# Patient Record
Sex: MALE | Race: OTHER | HISPANIC OR LATINO | Employment: OTHER | URBAN - METROPOLITAN AREA
[De-identification: names, ages, dates, MRNs, and addresses within clinical notes are randomized per-mention and may not be internally consistent; named-entity substitution may affect disease eponyms.]

---

## 2017-03-28 DIAGNOSIS — I70.213 ATHEROSCLEROSIS OF NATIVE ARTERY OF BOTH LOWER EXTREMITIES WITH INTERMITTENT CLAUDICATION (HCC): ICD-10-CM

## 2017-04-14 ENCOUNTER — GENERIC CONVERSION - ENCOUNTER (OUTPATIENT)
Dept: OTHER | Facility: OTHER | Age: 62
End: 2017-04-14

## 2017-04-20 ENCOUNTER — GENERIC CONVERSION - ENCOUNTER (OUTPATIENT)
Dept: OTHER | Facility: OTHER | Age: 62
End: 2017-04-20

## 2017-04-28 ENCOUNTER — APPOINTMENT (EMERGENCY)
Dept: RADIOLOGY | Facility: HOSPITAL | Age: 62
End: 2017-04-28
Payer: COMMERCIAL

## 2017-04-28 ENCOUNTER — HOSPITAL ENCOUNTER (EMERGENCY)
Facility: HOSPITAL | Age: 62
Discharge: HOME | End: 2017-04-28
Attending: EMERGENCY MEDICINE | Admitting: EMERGENCY MEDICINE
Payer: COMMERCIAL

## 2017-04-28 VITALS
TEMPERATURE: 98.6 F | DIASTOLIC BLOOD PRESSURE: 97 MMHG | HEART RATE: 89 BPM | WEIGHT: 184 LBS | RESPIRATION RATE: 18 BRPM | SYSTOLIC BLOOD PRESSURE: 177 MMHG | OXYGEN SATURATION: 97 %

## 2017-04-28 DIAGNOSIS — M54.12 RADICULOPATHY OF CERVICAL REGION: Primary | ICD-10-CM

## 2017-04-28 DIAGNOSIS — M79.609 MUSCULOSKELETAL PAIN OF EXTREMITY: ICD-10-CM

## 2017-04-28 PROCEDURE — 72040 X-RAY EXAM NECK SPINE 2-3 VW: CPT

## 2017-04-28 PROCEDURE — 99283 EMERGENCY DEPT VISIT LOW MDM: CPT

## 2017-04-28 PROCEDURE — 96372 THER/PROPH/DIAG INJ SC/IM: CPT

## 2017-04-28 PROCEDURE — 72072 X-RAY EXAM THORAC SPINE 3VWS: CPT

## 2017-04-28 RX ORDER — NAPROXEN 500 MG/1
500 TABLET ORAL 2 TIMES DAILY WITH MEALS
Qty: 20 TABLET | Refills: 0 | Status: SHIPPED | OUTPATIENT
Start: 2017-04-28 | End: 2018-02-06

## 2017-04-28 RX ORDER — KETOROLAC TROMETHAMINE 30 MG/ML
30 INJECTION, SOLUTION INTRAMUSCULAR; INTRAVENOUS ONCE
Status: COMPLETED | OUTPATIENT
Start: 2017-04-28 | End: 2017-04-28

## 2017-04-28 RX ADMIN — KETOROLAC TROMETHAMINE 30 MG: 30 INJECTION, SOLUTION INTRAMUSCULAR at 13:28

## 2017-05-04 ENCOUNTER — ALLSCRIPTS OFFICE VISIT (OUTPATIENT)
Dept: OTHER | Facility: OTHER | Age: 62
End: 2017-05-04

## 2017-05-04 ENCOUNTER — APPOINTMENT (OUTPATIENT)
Dept: LAB | Facility: CLINIC | Age: 62
End: 2017-05-04
Payer: COMMERCIAL

## 2017-05-04 ENCOUNTER — TRANSCRIBE ORDERS (OUTPATIENT)
Dept: LAB | Facility: CLINIC | Age: 62
End: 2017-05-04

## 2017-05-04 ENCOUNTER — LAB REQUISITION (OUTPATIENT)
Dept: LAB | Facility: HOSPITAL | Age: 62
End: 2017-05-04
Payer: COMMERCIAL

## 2017-05-04 DIAGNOSIS — I10 ESSENTIAL (PRIMARY) HYPERTENSION: ICD-10-CM

## 2017-05-04 DIAGNOSIS — I25.10 ATHEROSCLEROTIC HEART DISEASE OF NATIVE CORONARY ARTERY WITHOUT ANGINA PECTORIS: ICD-10-CM

## 2017-05-04 DIAGNOSIS — M54.2 CERVICALGIA: ICD-10-CM

## 2017-05-04 DIAGNOSIS — E78.5 HYPERLIPIDEMIA: ICD-10-CM

## 2017-05-04 DIAGNOSIS — R73.03 PREDIABETES: ICD-10-CM

## 2017-05-04 DIAGNOSIS — R39.89 OTHER SYMPTOMS AND SIGNS INVOLVING THE GENITOURINARY SYSTEM: ICD-10-CM

## 2017-05-04 DIAGNOSIS — M54.10 RADICULOPATHY: ICD-10-CM

## 2017-05-04 DIAGNOSIS — R33.9 RETENTION OF URINE: ICD-10-CM

## 2017-05-04 LAB
ALBUMIN SERPL BCP-MCNC: 3.8 G/DL (ref 3.5–5)
ALP SERPL-CCNC: 104 U/L (ref 46–116)
ALT SERPL W P-5'-P-CCNC: 60 U/L (ref 12–78)
ANION GAP SERPL CALCULATED.3IONS-SCNC: 8 MMOL/L (ref 4–13)
AST SERPL W P-5'-P-CCNC: 42 U/L (ref 5–45)
BILIRUB SERPL-MCNC: 1.5 MG/DL (ref 0.2–1)
BILIRUB UR QL STRIP: NORMAL
BUN SERPL-MCNC: 9 MG/DL (ref 5–25)
CALCIUM SERPL-MCNC: 9 MG/DL (ref 8.3–10.1)
CHLORIDE SERPL-SCNC: 103 MMOL/L (ref 100–108)
CLARITY UR: NORMAL
CO2 SERPL-SCNC: 28 MMOL/L (ref 21–32)
COLOR UR: NORMAL
CREAT SERPL-MCNC: 0.83 MG/DL (ref 0.6–1.3)
GFR SERPL CREATININE-BSD FRML MDRD: >60 ML/MIN/1.73SQ M
GLUCOSE (HISTORICAL): 1000
GLUCOSE SERPL-MCNC: 164 MG/DL (ref 65–140)
GLUCOSE SERPL-MCNC: 173 MG/DL
HGB UR QL STRIP.AUTO: NORMAL
KETONES UR STRIP-MCNC: NORMAL MG/DL
LEUKOCYTE ESTERASE UR QL STRIP: NORMAL
NITRITE UR QL STRIP: NORMAL
PH UR STRIP.AUTO: 7.5 [PH]
POTASSIUM SERPL-SCNC: 4.5 MMOL/L (ref 3.5–5.3)
PROT SERPL-MCNC: 7.6 G/DL (ref 6.4–8.2)
PROT UR STRIP-MCNC: 30 MG/DL
SODIUM SERPL-SCNC: 139 MMOL/L (ref 136–145)
SP GR UR STRIP.AUTO: 1.01
UROBILINOGEN UR QL STRIP.AUTO: 4

## 2017-05-04 PROCEDURE — 80053 COMPREHEN METABOLIC PANEL: CPT

## 2017-05-04 PROCEDURE — 81003 URINALYSIS AUTO W/O SCOPE: CPT | Performed by: INTERNAL MEDICINE

## 2017-05-04 PROCEDURE — 36415 COLL VENOUS BLD VENIPUNCTURE: CPT

## 2017-05-05 ENCOUNTER — APPOINTMENT (OUTPATIENT)
Dept: LAB | Facility: CLINIC | Age: 62
End: 2017-05-05
Payer: COMMERCIAL

## 2017-05-05 DIAGNOSIS — I25.10 ATHEROSCLEROTIC HEART DISEASE OF NATIVE CORONARY ARTERY WITHOUT ANGINA PECTORIS: ICD-10-CM

## 2017-05-05 DIAGNOSIS — E78.5 HYPERLIPIDEMIA: ICD-10-CM

## 2017-05-05 LAB
BILIRUB UR QL STRIP: NEGATIVE
CHOLEST SERPL-MCNC: 111 MG/DL (ref 50–200)
CLARITY UR: CLEAR
COLOR UR: ABNORMAL
GLUCOSE UR STRIP-MCNC: ABNORMAL MG/DL
HDLC SERPL-MCNC: 24 MG/DL (ref 40–60)
HGB UR QL STRIP.AUTO: NEGATIVE
KETONES UR STRIP-MCNC: NEGATIVE MG/DL
LDLC SERPL CALC-MCNC: 18 MG/DL (ref 0–100)
LEUKOCYTE ESTERASE UR QL STRIP: NEGATIVE
NITRITE UR QL STRIP: NEGATIVE
PH UR STRIP.AUTO: 7.5 [PH] (ref 4.5–8)
PROT UR STRIP-MCNC: NEGATIVE MG/DL
SP GR UR STRIP.AUTO: 1.02 (ref 1–1.03)
TRIGL SERPL-MCNC: 343 MG/DL
UROBILINOGEN UR QL STRIP.AUTO: 2 E.U./DL

## 2017-05-05 PROCEDURE — 36415 COLL VENOUS BLD VENIPUNCTURE: CPT

## 2017-05-05 PROCEDURE — 80061 LIPID PANEL: CPT

## 2017-05-08 ENCOUNTER — GENERIC CONVERSION - ENCOUNTER (OUTPATIENT)
Dept: OTHER | Facility: OTHER | Age: 62
End: 2017-05-08

## 2017-05-09 ENCOUNTER — GENERIC CONVERSION - ENCOUNTER (OUTPATIENT)
Dept: OTHER | Facility: OTHER | Age: 62
End: 2017-05-09

## 2017-05-10 ENCOUNTER — ALLSCRIPTS OFFICE VISIT (OUTPATIENT)
Dept: OTHER | Facility: OTHER | Age: 62
End: 2017-05-10

## 2017-05-10 LAB — HBA1C MFR BLD HPLC: 6.7 %

## 2017-05-17 ENCOUNTER — GENERIC CONVERSION - ENCOUNTER (OUTPATIENT)
Dept: OTHER | Facility: OTHER | Age: 62
End: 2017-05-17

## 2017-05-19 ENCOUNTER — ALLSCRIPTS OFFICE VISIT (OUTPATIENT)
Dept: OTHER | Facility: OTHER | Age: 62
End: 2017-05-19

## 2017-05-23 ENCOUNTER — ALLSCRIPTS OFFICE VISIT (OUTPATIENT)
Dept: OTHER | Facility: OTHER | Age: 62
End: 2017-05-23

## 2017-06-16 ENCOUNTER — GENERIC CONVERSION - ENCOUNTER (OUTPATIENT)
Dept: OTHER | Facility: OTHER | Age: 62
End: 2017-06-16

## 2017-06-16 ENCOUNTER — ALLSCRIPTS OFFICE VISIT (OUTPATIENT)
Dept: OTHER | Facility: OTHER | Age: 62
End: 2017-06-16

## 2017-06-16 DIAGNOSIS — R09.89 OTHER SPECIFIED SYMPTOMS AND SIGNS INVOLVING THE CIRCULATORY AND RESPIRATORY SYSTEMS: ICD-10-CM

## 2017-06-16 LAB
LEFT EYE DIABETIC RETINOPATHY: NORMAL
RIGHT EYE DIABETIC RETINOPATHY: NORMAL

## 2017-06-21 ENCOUNTER — OPTICAL OFFICE (OUTPATIENT)
Dept: URBAN - METROPOLITAN AREA CLINIC 146 | Facility: CLINIC | Age: 62
Setting detail: OPHTHALMOLOGY
End: 2017-06-21

## 2017-06-21 DIAGNOSIS — H52.223: ICD-10-CM

## 2017-06-21 PROCEDURE — V2203 LENS SPHCYL BIFOCAL 4.00D/.1: HCPCS | Performed by: OPHTHALMOLOGY

## 2017-06-21 PROCEDURE — V2020 VISION SVCS FRAMES PURCHASES: HCPCS | Performed by: OPHTHALMOLOGY

## 2017-07-05 ENCOUNTER — ALLSCRIPTS OFFICE VISIT (OUTPATIENT)
Dept: OTHER | Facility: OTHER | Age: 62
End: 2017-07-05

## 2017-07-05 LAB — GLUCOSE SERPL-MCNC: 95 MG/DL

## 2017-07-27 ENCOUNTER — ALLSCRIPTS OFFICE VISIT (OUTPATIENT)
Dept: OTHER | Facility: OTHER | Age: 62
End: 2017-07-27

## 2017-08-24 ENCOUNTER — TRANSCRIBE ORDERS (OUTPATIENT)
Dept: ADMINISTRATIVE | Facility: HOSPITAL | Age: 62
End: 2017-08-24

## 2017-08-24 ENCOUNTER — ALLSCRIPTS OFFICE VISIT (OUTPATIENT)
Dept: OTHER | Facility: OTHER | Age: 62
End: 2017-08-24

## 2017-08-24 ENCOUNTER — APPOINTMENT (OUTPATIENT)
Dept: LAB | Facility: CLINIC | Age: 62
End: 2017-08-24
Payer: COMMERCIAL

## 2017-08-24 ENCOUNTER — GENERIC CONVERSION - ENCOUNTER (OUTPATIENT)
Dept: OTHER | Facility: OTHER | Age: 62
End: 2017-08-24

## 2017-08-24 DIAGNOSIS — M54.10 RADICULAR SYNDROME OF LOWER LIMBS: ICD-10-CM

## 2017-08-24 DIAGNOSIS — E11.40 TYPE 2 DIABETES MELLITUS WITH DIABETIC NEUROPATHY (HCC): ICD-10-CM

## 2017-08-24 DIAGNOSIS — R77.8 OTHER SPECIFIED ABNORMALITIES OF PLASMA PROTEINS: ICD-10-CM

## 2017-08-24 DIAGNOSIS — M43.02 SPONDYLOLYSIS OF CERVICAL REGION: Primary | ICD-10-CM

## 2017-08-24 DIAGNOSIS — M54.2 CERVICALGIA: ICD-10-CM

## 2017-08-24 DIAGNOSIS — I10 ESSENTIAL (PRIMARY) HYPERTENSION: ICD-10-CM

## 2017-08-24 LAB
ALBUMIN SERPL BCP-MCNC: 3.8 G/DL (ref 3.5–5)
ALP SERPL-CCNC: 92 U/L (ref 46–116)
ALT SERPL W P-5'-P-CCNC: 38 U/L (ref 12–78)
ANION GAP SERPL CALCULATED.3IONS-SCNC: 9 MMOL/L (ref 4–13)
AST SERPL W P-5'-P-CCNC: 26 U/L (ref 5–45)
BILIRUB SERPL-MCNC: 1.3 MG/DL (ref 0.2–1)
BUN SERPL-MCNC: 18 MG/DL (ref 5–25)
CALCIUM SERPL-MCNC: 9.2 MG/DL (ref 8.3–10.1)
CHLORIDE SERPL-SCNC: 101 MMOL/L (ref 100–108)
CO2 SERPL-SCNC: 28 MMOL/L (ref 21–32)
CREAT SERPL-MCNC: 0.91 MG/DL (ref 0.6–1.3)
EST. AVERAGE GLUCOSE BLD GHB EST-MCNC: 105 MG/DL
GFR SERPL CREATININE-BSD FRML MDRD: 89 ML/MIN/1.73SQ M
GLUCOSE SERPL-MCNC: 91 MG/DL (ref 65–140)
HBA1C MFR BLD: 5.3 % (ref 4.2–6.3)
POTASSIUM SERPL-SCNC: 3.6 MMOL/L (ref 3.5–5.3)
PROT SERPL-MCNC: 8.4 G/DL (ref 6.4–8.2)
SODIUM SERPL-SCNC: 138 MMOL/L (ref 136–145)

## 2017-08-24 PROCEDURE — 36415 COLL VENOUS BLD VENIPUNCTURE: CPT

## 2017-08-24 PROCEDURE — 80053 COMPREHEN METABOLIC PANEL: CPT

## 2017-08-24 PROCEDURE — 83036 HEMOGLOBIN GLYCOSYLATED A1C: CPT

## 2017-08-30 ENCOUNTER — APPOINTMENT (OUTPATIENT)
Dept: PHYSICAL THERAPY | Facility: CLINIC | Age: 62
End: 2017-08-30
Payer: COMMERCIAL

## 2017-08-30 PROCEDURE — 97162 PT EVAL MOD COMPLEX 30 MIN: CPT

## 2017-08-30 PROCEDURE — 97110 THERAPEUTIC EXERCISES: CPT

## 2017-08-31 ENCOUNTER — GENERIC CONVERSION - ENCOUNTER (OUTPATIENT)
Dept: OTHER | Facility: OTHER | Age: 62
End: 2017-08-31

## 2017-09-06 ENCOUNTER — APPOINTMENT (OUTPATIENT)
Dept: PHYSICAL THERAPY | Facility: CLINIC | Age: 62
End: 2017-09-06
Payer: COMMERCIAL

## 2017-09-07 ENCOUNTER — APPOINTMENT (OUTPATIENT)
Dept: PHYSICAL THERAPY | Facility: CLINIC | Age: 62
End: 2017-09-07
Payer: COMMERCIAL

## 2017-09-12 ENCOUNTER — APPOINTMENT (OUTPATIENT)
Dept: PHYSICAL THERAPY | Facility: CLINIC | Age: 62
End: 2017-09-12
Payer: COMMERCIAL

## 2017-09-13 ENCOUNTER — HOSPITAL ENCOUNTER (OUTPATIENT)
Dept: NON INVASIVE DIAGNOSTICS | Facility: CLINIC | Age: 62
Discharge: HOME | End: 2017-09-13
Payer: COMMERCIAL

## 2017-09-13 ENCOUNTER — OFFICE VISIT (OUTPATIENT)
Dept: RADIOLOGY | Facility: CLINIC | Age: 62
End: 2017-09-13
Payer: COMMERCIAL

## 2017-09-13 ENCOUNTER — APPOINTMENT (OUTPATIENT)
Dept: PHYSICAL THERAPY | Facility: CLINIC | Age: 62
End: 2017-09-13
Payer: COMMERCIAL

## 2017-09-13 DIAGNOSIS — M54.2 CERVICALGIA: ICD-10-CM

## 2017-09-13 DIAGNOSIS — M43.02 SPONDYLOLYSIS OF CERVICAL REGION: ICD-10-CM

## 2017-09-13 DIAGNOSIS — R09.89 OTHER SPECIFIED SYMPTOMS AND SIGNS INVOLVING THE CIRCULATORY AND RESPIRATORY SYSTEMS: ICD-10-CM

## 2017-09-13 DIAGNOSIS — M54.10 RADICULAR SYNDROME OF LOWER LIMBS: ICD-10-CM

## 2017-09-13 PROCEDURE — 97140 MANUAL THERAPY 1/> REGIONS: CPT

## 2017-09-13 PROCEDURE — 93923 UPR/LXTR ART STDY 3+ LVLS: CPT

## 2017-09-13 PROCEDURE — 93925 LOWER EXTREMITY STUDY: CPT

## 2017-09-13 PROCEDURE — 95886 MUSC TEST DONE W/N TEST COMP: CPT | Performed by: PHYSICAL THERAPIST

## 2017-09-13 PROCEDURE — 97110 THERAPEUTIC EXERCISES: CPT

## 2017-09-13 PROCEDURE — 97012 MECHANICAL TRACTION THERAPY: CPT

## 2017-09-13 PROCEDURE — 95908 NRV CNDJ TST 3-4 STUDIES: CPT | Performed by: PHYSICAL THERAPIST

## 2017-09-14 ENCOUNTER — GENERIC CONVERSION - ENCOUNTER (OUTPATIENT)
Dept: OTHER | Facility: OTHER | Age: 62
End: 2017-09-14

## 2017-09-14 ENCOUNTER — APPOINTMENT (OUTPATIENT)
Dept: PHYSICAL THERAPY | Facility: CLINIC | Age: 62
End: 2017-09-14
Payer: COMMERCIAL

## 2017-09-15 ENCOUNTER — APPOINTMENT (OUTPATIENT)
Dept: PHYSICAL THERAPY | Facility: CLINIC | Age: 62
End: 2017-09-15
Payer: COMMERCIAL

## 2017-09-19 ENCOUNTER — APPOINTMENT (OUTPATIENT)
Dept: PHYSICAL THERAPY | Facility: CLINIC | Age: 62
End: 2017-09-19
Payer: COMMERCIAL

## 2017-09-20 ENCOUNTER — APPOINTMENT (OUTPATIENT)
Dept: PHYSICAL THERAPY | Facility: CLINIC | Age: 62
End: 2017-09-20
Payer: COMMERCIAL

## 2017-09-20 PROCEDURE — 97012 MECHANICAL TRACTION THERAPY: CPT

## 2017-09-20 PROCEDURE — 97110 THERAPEUTIC EXERCISES: CPT

## 2017-09-21 ENCOUNTER — APPOINTMENT (OUTPATIENT)
Dept: PHYSICAL THERAPY | Facility: CLINIC | Age: 62
End: 2017-09-21
Payer: COMMERCIAL

## 2017-09-25 ENCOUNTER — APPOINTMENT (OUTPATIENT)
Dept: LAB | Facility: CLINIC | Age: 62
End: 2017-09-25
Payer: COMMERCIAL

## 2017-09-25 ENCOUNTER — ALLSCRIPTS OFFICE VISIT (OUTPATIENT)
Dept: OTHER | Facility: OTHER | Age: 62
End: 2017-09-25

## 2017-09-25 DIAGNOSIS — R77.8 OTHER SPECIFIED ABNORMALITIES OF PLASMA PROTEINS: ICD-10-CM

## 2017-09-25 DIAGNOSIS — I10 ESSENTIAL (PRIMARY) HYPERTENSION: ICD-10-CM

## 2017-09-25 DIAGNOSIS — I70.213 ATHEROSCLEROSIS OF NATIVE ARTERY OF BOTH LOWER EXTREMITIES WITH INTERMITTENT CLAUDICATION (HCC): ICD-10-CM

## 2017-09-25 DIAGNOSIS — E11.69 TYPE 2 DIABETES MELLITUS WITH OTHER SPECIFIED COMPLICATION (HCC): Primary | ICD-10-CM

## 2017-09-25 DIAGNOSIS — E11.40 TYPE 2 DIABETES MELLITUS WITH DIABETIC NEUROPATHY (HCC): ICD-10-CM

## 2017-09-25 LAB
BASOPHILS # BLD AUTO: 0.07 THOUSANDS/ΜL (ref 0–0.1)
BASOPHILS NFR BLD AUTO: 1 % (ref 0–1)
EOSINOPHIL # BLD AUTO: 0.14 THOUSAND/ΜL (ref 0–0.61)
EOSINOPHIL NFR BLD AUTO: 2 % (ref 0–6)
ERYTHROCYTE [DISTWIDTH] IN BLOOD BY AUTOMATED COUNT: 12.6 % (ref 11.6–15.1)
HCT VFR BLD AUTO: 41.9 % (ref 36.5–49.3)
HGB BLD-MCNC: 14.8 G/DL (ref 12–17)
LYMPHOCYTES # BLD AUTO: 2.28 THOUSANDS/ΜL (ref 0.6–4.47)
LYMPHOCYTES NFR BLD AUTO: 31 % (ref 14–44)
MCH RBC QN AUTO: 31.8 PG (ref 26.8–34.3)
MCHC RBC AUTO-ENTMCNC: 35.3 G/DL (ref 31.4–37.4)
MCV RBC AUTO: 90 FL (ref 82–98)
MONOCYTES # BLD AUTO: 0.43 THOUSAND/ΜL (ref 0.17–1.22)
MONOCYTES NFR BLD AUTO: 6 % (ref 4–12)
NEUTROPHILS # BLD AUTO: 4.46 THOUSANDS/ΜL (ref 1.85–7.62)
NEUTS SEG NFR BLD AUTO: 60 % (ref 43–75)
PLATELET # BLD AUTO: 150 THOUSANDS/UL (ref 149–390)
PMV BLD AUTO: 12.1 FL (ref 8.9–12.7)
RBC # BLD AUTO: 4.65 MILLION/UL (ref 3.88–5.62)
WBC # BLD AUTO: 7.38 THOUSAND/UL (ref 4.31–10.16)

## 2017-09-25 PROCEDURE — 86803 HEPATITIS C AB TEST: CPT

## 2017-09-25 PROCEDURE — 86334 IMMUNOFIX E-PHORESIS SERUM: CPT

## 2017-09-25 PROCEDURE — 87389 HIV-1 AG W/HIV-1&-2 AB AG IA: CPT

## 2017-09-25 PROCEDURE — 84165 PROTEIN E-PHORESIS SERUM: CPT

## 2017-09-25 PROCEDURE — 85025 COMPLETE CBC W/AUTO DIFF WBC: CPT

## 2017-09-25 PROCEDURE — 36415 COLL VENOUS BLD VENIPUNCTURE: CPT

## 2017-09-26 ENCOUNTER — APPOINTMENT (OUTPATIENT)
Dept: PHYSICAL THERAPY | Facility: CLINIC | Age: 62
End: 2017-09-26
Payer: COMMERCIAL

## 2017-09-26 LAB — HCV AB SER QL: NORMAL

## 2017-09-27 ENCOUNTER — APPOINTMENT (OUTPATIENT)
Dept: PHYSICAL THERAPY | Facility: CLINIC | Age: 62
End: 2017-09-27
Payer: COMMERCIAL

## 2017-09-27 LAB — HIV 1+2 AB+HIV1 P24 AG SERPL QL IA: NORMAL

## 2017-09-28 ENCOUNTER — APPOINTMENT (OUTPATIENT)
Dept: PHYSICAL THERAPY | Facility: CLINIC | Age: 62
End: 2017-09-28
Payer: COMMERCIAL

## 2017-09-28 ENCOUNTER — ALLSCRIPTS OFFICE VISIT (OUTPATIENT)
Dept: OTHER | Facility: OTHER | Age: 62
End: 2017-09-28

## 2017-09-28 LAB
ALBUMIN SERPL ELPH-MCNC: 4.08 G/DL (ref 3.5–5)
ALBUMIN SERPL ELPH-MCNC: 54.4 % (ref 52–65)
ALPHA1 GLOB SERPL ELPH-MCNC: 0.24 G/DL (ref 0.1–0.4)
ALPHA1 GLOB SERPL ELPH-MCNC: 3.2 % (ref 2.5–5)
ALPHA2 GLOB SERPL ELPH-MCNC: 0.65 G/DL (ref 0.4–1.2)
ALPHA2 GLOB SERPL ELPH-MCNC: 8.7 % (ref 7–13)
BETA GLOB ABNORMAL SERPL ELPH-MCNC: 0.41 G/DL (ref 0.4–0.8)
BETA1 GLOB SERPL ELPH-MCNC: 5.5 % (ref 5–13)
BETA2 GLOB SERPL ELPH-MCNC: 4.5 % (ref 2–8)
BETA2+GAMMA GLOB SERPL ELPH-MCNC: 0.34 G/DL (ref 0.2–0.5)
GAMMA GLOB ABNORMAL SERPL ELPH-MCNC: 1.78 G/DL (ref 0.5–1.6)
GAMMA GLOB SERPL ELPH-MCNC: 23.7 % (ref 12–22)
IGG/ALB SER: 1.19 {RATIO} (ref 1.1–1.8)
INTERPRETATION UR IFE-IMP: NORMAL
PROT SERPL-MCNC: 7.5 G/DL (ref 6.4–8.2)

## 2017-10-03 ENCOUNTER — APPOINTMENT (OUTPATIENT)
Dept: PHYSICAL THERAPY | Facility: CLINIC | Age: 62
End: 2017-10-03
Payer: COMMERCIAL

## 2017-10-04 ENCOUNTER — APPOINTMENT (OUTPATIENT)
Dept: PHYSICAL THERAPY | Facility: CLINIC | Age: 62
End: 2017-10-04
Payer: COMMERCIAL

## 2017-10-04 PROCEDURE — 97110 THERAPEUTIC EXERCISES: CPT

## 2017-10-05 NOTE — PROGRESS NOTES
Assessment  1  Atherosclerosis of native artery of both lower extremities with intermittent claudication   (440 21) (I70 213)   2  Coronary artery disease (414 00) (I25 10)    Plan  Coronary artery disease    · *1 - [facility] (CARDIOLOGY ) Co-Management  *  Status: Active   Requested for: 52ERP0303   Ordered; For: Coronary artery disease; Ordered By: Bimal Oar Performed:  Due: 89CIV6841; Last Updated By: Galileo Wahl; 9/28/2017 2:51:25 PM  Care Summary provided  : Yes    Discussion/Summary  Discussion Summary:   50-year-old male with HTN, HLD, DM, CAD status post CABG with bilateral lower extremity vein harvest '09, PAD with bilateral lower leg fatigue with walking approximately 3-4 blocks who presents for vascular evaluation after LEAD 9/13/7  showed Bilateral SFA stenosis and tibial disease with SARA 0 67 MTP/GTP 79/68 on the right and SARA 0 59,MTP/GTP 51/50 on the left  he has no limitation with his current symptoms and I have recommended he continue to walk and increase to daily  I did  him on worsening symptoms or development of an ulceration of the foot that require immediate evaluation  Will plan for repeat arterial duplex in 6 months  He should continue aspirin and statin therapies  I will see him back in the office in 1 year for risk factor modification or sooner should his symptom complex progress  CAD s/p CABG in [place]  Will refer to cardiology for management  Given PAD and CAD will screen for carotid disease at time of next lower extremity doppler  Chief Complaint  Chief Complaint Free Text Note Form:  I have pain in my feet  Mr Fatmata Corrales is new to our practice  He had a SY on 9/13/17  He is here today c/o bilateral feet pain when walking more than 3 blocks  He denies any edema at this time  He denies any wounds or sores at this time  He denies any pain that wakes him at night  He offers no further concerns at this time        History of Present Illness  HPI: Patient referred by Dr Robina Ortega for PAD  Patient was evaluated with lower extremity arterial duplex by Podiatry secondary to absent pedal pulses  Arterial duplex noted bilateral SFA stenosis and decreased SARA  He is referred to vascular further evaluation  Upon questioning he tells me he can walk 3-4 blocks before he develops fatigue in the tibial region  She ambulates with a cane  He does not drive and walks is main mode of transportation  He walks 3-4 blocks 2-3 times a week  he denies any rest pain and is not experiencing any tissue loss  He denies any history of TIA or CVA  He is primarily [address] speaking and  is used  Review of Systems  Complete Male - Vasc:   Constitutional: No fever or chills, feels well, no tiredness, no recent weight gain or weight loss  Eyes: No sudden vision loss, no blurred vision, no double vision  ENT: no loss of hearing, no nosebleeds, no hoarseness  Cardiovascular: no painful veins,-no leg pain with walking-and-no bleeding veins, but-regular heart rate,-no chest pain,-no intermittent leg claudication-and-no palpitations  Respiratory: No sob, no wheezing, no cough, no sob with exertion, no orthopnea  Gastrointestinal: No nausea, No vomiting, no diarrhea, no blood in stool  Genitourinary: no dysuria, no hematuria, No urinary incontinence, no erectile dysfunction  Musculoskeletal: no limb pain, no limb swelling  Integumentary: no rash, no lesions, no wounds, no ulcer  Neurological: no dementia-and-difficulty walking, but-no headache,-no numbness,-no confusion,-no dizziness,-no limb weakness,-no convulsions-and-no fainting  Psychiatric: no depression, no mood disorders, no anxiety  Hematologic/Lymphatic: no bleeding disorder, no easy bruising  ROS Reviewed:   ROS reviewed  Active Problems  1  Absent pedal pulses (785 9) (R09 89)   2  Benign essential hypertension (401 1) (I10)   3  Carpal tunnel syndrome, right (354 0) (G56 01)   4   Cervical spondylolysis (226 19) (M43 02)   5  Controlled type 2 diabetes mellitus with diabetic neuropathy, without long-term current   use of insulin (250 60,357 2) (E11 40)   6  Coronary artery disease (414 00) (I25 10)   7  Dry mouth (527 7) (R68 2)   8  Elevated total protein (790 99) (R77 8)   9  Glucosuria (791 5) (R81)   10  Hyperlipidemia (272 4) (E78 5)   11  Incomplete bladder emptying (788 21) (R33 9)   12  Need for immunization against influenza (V04 81) (Z23)   13  Other constipation (564 09) (K59 09)   14  Peripheral arterial disease (443 9) (I73 9)   15  Posterior neck pain (723 1) (M54 2)   16  Radicular pain (729 2) (M54 10)   17  Seasonal allergies (477 9) (J30 2)   18  Severe carpal tunnel syndrome of right wrist (354 0) (G56 01)   19  Urine troubles (V47 4) (R39 89)    Past Medical History  1  No pertinent past medical history  Active Problems And Past Medical History Reviewed: The active problems and past medical history were reviewed and updated today  Surgical History  1  History of CABG  Surgical History Reviewed: The surgical history was reviewed and updated today  Family History  Mother    1  Family history of diabetes mellitus (V18 0) (Z83 3)   2  Family history of heart disease (V17 49) (Z82 49)  Father    3  Family history of diabetes mellitus (V18 0) (Z83 3)   4  Family history of heart disease (V17 49) (Z82 49)  Brother    5  Family history of hypertension (V17 49) (Z82 49)  Family History    6  Denied: Family history of drug addiction   7  Family history of mental disorder (V17 0) (Z81 8)  Family History Reviewed: The family history was reviewed and updated today  Social History   · Inadequate exercise (V69 0) (Z72 3)   · Lives with adult children   · Never a smoker   · No alcohol use   · No illicit drug use   · Non-smoker (V49 89) (Z78 9)   · [place]yanese speaking patient (V40 1) (Z78 9)  Social History Reviewed: The social history was reviewed and updated today  Current Meds   1   Aspirin 81 MG Oral Tablet Delayed Release; TAKE 1 TABLET DAILY; Therapy: 72XTN7535 to (Evaluate:95Zlm5729)  Requested for: 66RHG6713; Last   WI:46PQF7701 Ordered   2  Carpal Tunnel Wrist Stabilizer Miscellaneous; USE AS DIRECTED EVERY DAY FOR 6-8   WEEKS; Therapy: 26Ngn2322 to (Last Rx:65Vhy1523) Ordered   3  FreeStyle Lancets Miscellaneous; TEST 1 TIME DAILY; Therapy: 52TNO9384 to (Last Jerilyn Hinds)  Requested for: 98Qqr1816 Ordered   4  FreeStyle Lite Device; USE AS DIRECTED; Therapy: 29YSU8305 to (Last Jerilyn Hinds)  Requested for: 16AWA3813 Ordered   5  FreeStyle Lite Test In Vitro Strip; TEST ONCE DAILY; Therapy: 11JYH6665 to (Last Jerilyn Hinds)  Requested for: 12ANM2300 Ordered   6  Gabapentin 100 MG Oral Capsule; TAKE 1 CAPSULE EVERY DAY AT BEDTIME FOR 1   WEEK THEN INCREASE TO 2 CAPSULES AT BEDTIME; Therapy: 46JAR4477 to (Prudence Fair)  Requested for: 02Cyc3365; Last   Rx:09Esj7632 Ordered   7  Losartan Potassium 25 MG Oral Tablet; TAKE 1 TABLET BY MOUTH ONCE A DAY AS   DIRECTED; Therapy: 97Nub1265 to (Prudence Fair)  Requested for: 45Gmx6472; Last   Rx:60Wgd7462 Ordered   8  MetFORMIN HCl - 500 MG Oral Tablet; Take 1 tablet twice daily; Therapy: 21PQP9367 to (Prudence Fair)  Requested for: 51Inh6875; Last   Rx:32Dxe6127 Ordered   9  Metoprolol Succinate ER 50 MG Oral Tablet Extended Release 24 Hour; TAKE 1 TABLET   BY MOUTH ONCE A DAY; Therapy: 20YCW4045 to (Prudence Fair)  Requested for: 32Kcq7985; Last   Rx:69Vwg1358 Ordered   10  Simvastatin 40 MG Oral Tablet; take 1 tablet by mouth every evening; Therapy: 08Che0710 to (Evaluate:80Psa7342)  Requested for: 99Eqv7437; Last    Rx:70Ifo1466 Ordered  Medication List Reviewed: The medication list was reviewed and updated today  Allergies  1  No Known Drug Allergies  2  No Known Environmental Allergies   3   No Known Food Allergies    Vitals  Vital Signs    Recorded: 28NAG4013 01:31PM   Heart Rate 64, L Radial   Pulse Quality Normal, L Radial   Respiration 16   Systolic 731, LUE, Sitting   Diastolic 78, LUE, Sitting   Height 5 ft 10 in   Weight 176 lb    BMI Calculated 25 25   BSA Calculated 1 98     Physical Exam    Carotid: no bruit heard on the right-and-no bruit on the left  Femoral: right 2+-and-left 2+  Posterior tibialis: right 0-and-left 0  Dorsalis pedis: right 0-and-left 0  Distal Pulse Exam: Normal Capillary Refill  Extremities: No upper or lower extremity edema  LE Varicose Veins: right leg reticular veins,-no right leg spider veins-and-no left leg spider veins  The heart rate was normal  The rhythm was regular  Heart sounds: normal S1-and-normal S2    Murmurs: No murmurs were heard  Pulmonary   Respiratory effort: No increased work of breathing or signs of respiratory distress  Auscultation of lungs: Clear to auscultation  No wheezing, no rales, no rhonchi  Abdomen   Abdomen: Abdomen soft, non-tender, no masses, non distended, no rebound tenderness  Psychiatric   Orientation to person, place and time: Normal    Mood and affect: Normal    Ears, Nose, Mouth, and Throat   Hearing: Normal    Neurologic Sensory exam normal   Motor skills intact  Musculoskeletal   Gait and station: Normal -steady gait with cane -blackened toe nails  Skin   Skin and subcutaneous tissue: Normal without rashes or lesions  Palpation of skin and subcutaneous tissue: Normal turgor  Venous Disease: No lipodermatosclerosis, stasis dermatitis, hyperpigmentation, or atrophie renae noted on exam       Results/Data  VAS LOWER LIMB ARTERIAL DUPLEX, COMPLETE BILATERAL/GRAFTS [id number] 12:29PM Andi Duarte Order Number: [id number]    - Patient Instructions: To schedule this appointment, please contact Central Scheduling at [phone]       Test Name Result Flag Reference   VAS LOWER LIMB ARTERIAL DUPLEX, COMPLETE BILATERAL/GRAFTS (Report)     [facility]PORT   CLINICAL:   Indications: Bruit, Pulse Abnormality [R09 89]  Patient presents with diminished pedal pulses on physical examination  Risk Factors: The patient has history of Diabetes, Hypertension and   Hyperlipidemia  Brachial Blood Pressure: Right Brachial Pressure: 140/ mm Hg, Left Brachial   Pressure: 127/ mm Hg  FINDINGS:      Segment        Right      Left                        Impression PSV Impression PSV    Common Femoral Artery       110       103    Prox Profunda           188       132    Prox SFA              130        74    Mid SFA        >75%    425 >75%    578    Dist SFA              58 50-75%   258    Proximal Pop            72        60    Distal Pop             52        48    Dist Post Tibial          48        44    Dist  Ant  Tibial          57        32    Dist Peroneal            31        16             CONCLUSION:   Impression:   RIGHT LOWER LIMB:   Diffuse atherosclerotic disease throughout the femoral and popliteal arteries   with >75% stenosis in the proximal/mid superficial femoral artery  There is tibioperoneal disease  Ankle/Brachial index: 0 67, moderate claudication range   PVR/ PPG tracings are dampened  Metatarsal pressure 79 mm Hg   Great toe pressure 68 mm Hg, within the healing range      LEFT LOWER LIMB:   There is diffuse atherosclerotic disease throughout the femoral and popliteal   arteries with >75% stenosis in the mid superficial femoral artery and 50-75%   stenosis in the distal superficial femoral/proximal popliteal artery  There is tibioperoneal disease  Ankle/Brachial index: 0 59, severe claudication range   PVR/ PPG tracings are dampened  Metatarsal pressure 54 mm Hg   Great toe pressure 50 mm Hg, within the healing range      SIGNATURE:   Electronically Signed by: Brian Liu MD on 2017-09-13 05:51:40 PM     Future Appointments    Date/Time Provider Specialty Site   11/08/2017 08:30 AM MERLINE Garcia   Orthopedic Surgery [address]   10/17/2017 11:00 AM Bianca Aldridge DO Internal Medicine [facility] INTERNAL MED   10/12/2017 03:00 PM MERLINE Acosta   Cardiology [address]     Signatures   Electronically signed by : Newton Sharma; Sep 28 2017  3:31PM EST                       (Author)    Electronically signed by : Jermaine Jefferson MD; Oct  4 2017 10:37AM EST

## 2017-10-11 ENCOUNTER — APPOINTMENT (OUTPATIENT)
Dept: PHYSICAL THERAPY | Facility: CLINIC | Age: 62
End: 2017-10-11
Payer: COMMERCIAL

## 2017-10-11 ENCOUNTER — GENERIC CONVERSION - ENCOUNTER (OUTPATIENT)
Dept: OTHER | Facility: OTHER | Age: 62
End: 2017-10-11

## 2017-10-11 PROCEDURE — G8991 OTHER PT/OT GOAL STATUS: HCPCS

## 2017-10-11 PROCEDURE — 97140 MANUAL THERAPY 1/> REGIONS: CPT

## 2017-10-11 PROCEDURE — G8990 OTHER PT/OT CURRENT STATUS: HCPCS

## 2017-10-11 PROCEDURE — 97110 THERAPEUTIC EXERCISES: CPT

## 2017-10-13 ENCOUNTER — ALLSCRIPTS OFFICE VISIT (OUTPATIENT)
Dept: OTHER | Facility: OTHER | Age: 62
End: 2017-10-13

## 2017-10-18 ENCOUNTER — APPOINTMENT (OUTPATIENT)
Dept: PHYSICAL THERAPY | Facility: CLINIC | Age: 62
End: 2017-10-18
Payer: COMMERCIAL

## 2017-10-18 PROCEDURE — 97110 THERAPEUTIC EXERCISES: CPT

## 2017-10-19 ENCOUNTER — ALLSCRIPTS OFFICE VISIT (OUTPATIENT)
Dept: OTHER | Facility: OTHER | Age: 62
End: 2017-10-19

## 2017-10-19 ENCOUNTER — GENERIC CONVERSION - ENCOUNTER (OUTPATIENT)
Dept: OTHER | Facility: OTHER | Age: 62
End: 2017-10-19

## 2017-10-25 ENCOUNTER — TRANSCRIBE ORDERS (OUTPATIENT)
Dept: LAB | Facility: CLINIC | Age: 62
End: 2017-10-25

## 2017-10-25 ENCOUNTER — GENERIC CONVERSION - ENCOUNTER (OUTPATIENT)
Dept: OTHER | Facility: OTHER | Age: 62
End: 2017-10-25

## 2017-10-25 ENCOUNTER — APPOINTMENT (OUTPATIENT)
Dept: PHYSICAL THERAPY | Facility: CLINIC | Age: 62
End: 2017-10-25
Payer: COMMERCIAL

## 2017-10-25 ENCOUNTER — APPOINTMENT (OUTPATIENT)
Dept: LAB | Facility: CLINIC | Age: 62
End: 2017-10-25
Payer: COMMERCIAL

## 2017-10-25 DIAGNOSIS — E11.40 TYPE 2 DIABETES MELLITUS WITH DIABETIC NEUROPATHY (HCC): ICD-10-CM

## 2017-10-25 DIAGNOSIS — R77.8 OTHER SPECIFIED ABNORMALITIES OF PLASMA PROTEINS: ICD-10-CM

## 2017-10-25 DIAGNOSIS — I10 ESSENTIAL (PRIMARY) HYPERTENSION: ICD-10-CM

## 2017-10-25 LAB
ALBUMIN SERPL BCP-MCNC: 3.4 G/DL (ref 3.5–5)
ALP SERPL-CCNC: 88 U/L (ref 46–116)
ALT SERPL W P-5'-P-CCNC: 39 U/L (ref 12–78)
ANION GAP SERPL CALCULATED.3IONS-SCNC: 9 MMOL/L (ref 4–13)
AST SERPL W P-5'-P-CCNC: 24 U/L (ref 5–45)
BILIRUB SERPL-MCNC: 0.9 MG/DL (ref 0.2–1)
BUN SERPL-MCNC: 10 MG/DL (ref 5–25)
CALCIUM SERPL-MCNC: 8.5 MG/DL (ref 8.3–10.1)
CHLORIDE SERPL-SCNC: 103 MMOL/L (ref 100–108)
CO2 SERPL-SCNC: 26 MMOL/L (ref 21–32)
CREAT SERPL-MCNC: 0.89 MG/DL (ref 0.6–1.3)
EST. AVERAGE GLUCOSE BLD GHB EST-MCNC: 108 MG/DL
GFR SERPL CREATININE-BSD FRML MDRD: 91 ML/MIN/1.73SQ M
GLUCOSE SERPL-MCNC: 206 MG/DL (ref 65–140)
HBA1C MFR BLD: 5.4 % (ref 4.2–6.3)
POTASSIUM SERPL-SCNC: 3.9 MMOL/L (ref 3.5–5.3)
PROT SERPL-MCNC: 7.4 G/DL (ref 6.4–8.2)
SODIUM SERPL-SCNC: 138 MMOL/L (ref 136–145)

## 2017-10-25 PROCEDURE — 83036 HEMOGLOBIN GLYCOSYLATED A1C: CPT

## 2017-10-25 PROCEDURE — 36415 COLL VENOUS BLD VENIPUNCTURE: CPT

## 2017-10-25 PROCEDURE — 80053 COMPREHEN METABOLIC PANEL: CPT

## 2017-10-25 PROCEDURE — 97110 THERAPEUTIC EXERCISES: CPT

## 2017-11-16 ENCOUNTER — GENERIC CONVERSION - ENCOUNTER (OUTPATIENT)
Dept: OTHER | Facility: OTHER | Age: 62
End: 2017-11-16

## 2017-11-21 ENCOUNTER — ALLSCRIPTS OFFICE VISIT (OUTPATIENT)
Dept: OTHER | Facility: OTHER | Age: 62
End: 2017-11-21

## 2018-01-09 NOTE — MISCELLANEOUS
Provider Comments  Provider Comments:   pt was a no show for his ov with dr Regla Fleming on 05/23/2017 / Tierney Camacho    also changed PCP since initial OV, no longer under our care      Signatures   Electronically signed by : Sidra Cabrera DO; May 23 2017 11:27AM EST                       (Author)

## 2018-01-10 NOTE — RESULT NOTES
Verified Results  VAS LOWER LIMB ARTERIAL DUPLEX, COMPLETE BILATERAL/GRAFTS [id number] 12:29PM Tian Berry Order Number: [id number]    - Patient Instructions: To schedule this appointment, please contact Central Scheduling at [phone]  Test Name Result Flag Reference   VAS LOWER LIMB ARTERIAL DUPLEX, COMPLETE BILATERAL/GRAFTS (Report)     [facility]PORT   CLINICAL:   Indications: Bruit, Pulse Abnormality [R09 89]  Patient presents with diminished pedal pulses on physical examination  Risk Factors: The patient has history of Diabetes, Hypertension and   Hyperlipidemia  Brachial Blood Pressure: Right Brachial Pressure: 140/ mm Hg, Left Brachial   Pressure: 127/ mm Hg  FINDINGS:      Segment        Right      Left                        Impression PSV Impression PSV    Common Femoral Artery       110       103    Prox Profunda           188       132    Prox SFA              130        74    Mid SFA        >75%    425 >75%    578    Dist SFA              58 50-75%   258    Proximal Pop            72        60    Distal Pop             52        48    Dist Post Tibial          48        44    Dist  Ant  Tibial          57        32    Dist Peroneal            31        16             CONCLUSION:   Impression:   RIGHT LOWER LIMB:   Diffuse atherosclerotic disease throughout the femoral and popliteal arteries   with >75% stenosis in the proximal/mid superficial femoral artery  There is tibioperoneal disease  Ankle/Brachial index: 0 67, moderate claudication range   PVR/ PPG tracings are dampened  Metatarsal pressure 79 mm Hg   Great toe pressure 68 mm Hg, within the healing range      LEFT LOWER LIMB:   There is diffuse atherosclerotic disease throughout the femoral and popliteal   arteries with >75% stenosis in the mid superficial femoral artery and 50-75%   stenosis in the distal superficial femoral/proximal popliteal artery  There is tibioperoneal disease  Ankle/Brachial index: 0 59, severe claudication range   PVR/ PPG tracings are dampened     Metatarsal pressure 54 mm Hg   Great toe pressure 50 mm Hg, within the healing range      SIGNATURE:   Electronically Signed by: Dorian Rasmussen MD on 2017-09-13 05:51:40 PM       Plan  Carpal tunnel syndrome, right    · *1 - [facility]  evaluate and treat  Status: Active   Requested for: 11IYV2886  Care Summary provided  : Yes  Controlled type 2 diabetes mellitus with diabetic neuropathy, without long-term current  use of insulin    · Accu-Chek Maylin SmartView w/Device Kit   · Accu-Chek SmartView In Vitro Strip   · Accu-Chek Soft Touch Lancets Miscellaneous   · FreeStyle Lancets Miscellaneous; TEST 1 TIME DAILY   · FreeStyle Lite Device; USE AS DIRECTED   · FreeStyle Lite Test In Vitro Strip; TEST ONCE DAILY  Controlled type 2 diabetes mellitus with hyperglycemia    · Accu-Chek FastClix Lancets Miscellaneous  Elevated total protein    · (1) HEP C ANTIBODY; Status:Resulted - Requires Verification;   Done: 02LUY1119  01:00PM  Need for immunization against influenza    · Fluzone Quadrivalent Intramuscular Suspension  PMH: CABG, Controlled type 2 diabetes mellitus with diabetic neuropathy, without  long-term current use of insulin, Peripheral arterial disease    · 1 - Naveed BEAUCHAMP, Parker Castillo  (Vascular Surgery) Co-Management  61 y/o M with severe PAD  and claudication with walking even short distances    evaluate and treat    pt speaks Luxembourgish  Status: Active  Requested for: 55Awu0860  Care Summary provided  : Yes

## 2018-01-10 NOTE — RESULT NOTES
Verified Results  (1) LIPID PANEL FASTING W DIRECT LDL REFLEX [id number] 01:04PM Lesia Alexander     Test Name Result Flag Reference   CHOLESTEROL 111 mg/dL     LDL CHOLESTEROL CALCULATED 18 mg/dL  0-100   Triglyceride:         Normal              <150 mg/dl       Borderline High    150-199 mg/dl       High               200-499 mg/dl       Very High          >499 mg/dl  Cholesterol:         Desirable        <200 mg/dl      Borderline High  200-239 mg/dl      High             >239 mg/dl  HDL Cholesterol:        High    >59 mg/dL      Low     <41 mg/dL  LDL Cholesterol:        Optimal          <100 mg/dl        Near Optimal     100-129 mg/dl        Above Optimal          Borderline High   130-159 mg/dl          High              160-189 mg/dl          Very High        >189 mg/dl  LDL CALCULATED:    This screening LDL is a calculated result  It does not have the accuracy of the Direct Measured LDL in the monitoring of patients with hyperlipidemia and/or statin therapy  Direct Measure LDL (ASS789) must be ordered separately in these patients  TRIGLYCERIDES 343 mg/dL H <=150   Specimen collection should occur prior to N-Acetylcysteine or Metamizole administration due to the potential for falsely depressed results  HDL,DIRECT 24 mg/dL L 40-60   Specimen collection should occur prior to Metamizole administration due to the potential for falsely depressed results       (1) URINALYSIS w URINE C/S REFLEX (will reflex a microscopy if leukocytes, occult blood, or nitrites are not within normal limits) [id number] 08:08PM Chema Putnam     Test Name Result Flag Reference   COLOR Dk Yellow     CLARITY Clear     PH UA 7 5  4 5-8 0   LEUKOCYTE ESTERASE UA Negative  Negative   NITRITE UA Negative  Negative   PROTEIN UA Negative mg/dl  Negative   GLUCOSE  (1/2%) mg/dl A Negative   KETONES UA Negative mg/dl  Negative   UROBILINOGEN UA 2 0 E U /dl A 0 2, 1 0 E U /dl   BILIRUBIN UA Negative  Negative   BLOOD UA Negative Negative   SPECIFIC GRAVITY UA 1 025  1 003-1 030

## 2018-01-11 NOTE — PROGRESS NOTES
History of Present Illness  Care Coordination Encounter Information:   Type of Encounter: In Office   Contact: Follow-Up    Spoke to Patient    used  Care Coordination  Nurse [address] [place] [address] [address]: Doing good  [employer] application completed and previously submitted  States that he rode on the [employer] once and never heard from them  Will call [employer] and inquire  All prescriptions filled  Denies difficulty obtaining or affording medications  Interested in attending social activities or events in [place] to play games during the week  Will look into resources available  Interested in attending diabetic classes  Will inquire into Lao speaking classes available  States that he has a glucometer and testing supplies  Needs assistance with how to use meter  Instructed to bring meter and supplies with him to next appt for further instruction  Spoke with [employer]   States that they received his application and sent him further information to his home on 10/3/17  States that she will look further into this and return my call  My contact information was provided  Active Problems    1  Absent pedal pulses (785 9) (R09 89)   2  Atherosclerosis of native artery of both lower extremities with intermittent claudication   (440 21) (I70 213)   3  Benign essential hypertension (401 1) (I10)   4  Carpal tunnel syndrome, right (354 0) (G56 01)   5  Cervical spondylolysis (756 19) (M43 02)   6  Controlled type 2 diabetes mellitus with diabetic neuropathy, without long-term current   use of insulin (250 60,357 2) (E11 40)   7  Coronary artery disease (414 00) (I25 10)   8  Dry cough (786 2) (R05)   9  Dry mouth (527 7) (R68 2)   10  Elevated total protein (790 99) (R77 8)   11  Glucosuria (791 5) (R81)   12  Hyperlipidemia (272 4) (E78 5)   13  Incomplete bladder emptying (788 21) (R33 9)   14  Need for immunization against influenza (V04 81) (Z23)   15  Need for pneumococcal vaccination (V03 82) (Z23)   16  Other constipation (564 09) (K59 09)   17  Peripheral arterial disease (443 9) (I73 9)   18  Posterior neck pain (723 1) (M54 2)   19  Radicular pain (729 2) (M54 10)   20  Seasonal allergies (477 9) (J30 2)   21  Severe carpal tunnel syndrome of right wrist (354 0) (G56 01)   22  Urine troubles (V47 4) (R39 89)    Past Medical History    1  No pertinent past medical history    Surgical History    1  History of CABG    Family History  Mother    1  Family history of diabetes mellitus (V18 0) (Z83 3)   2  Family history of heart disease (V17 49) (Z82 49)  Father    3  Family history of diabetes mellitus (V18 0) (Z83 3)   4  Family history of heart disease (V17 49) (Z82 49)  Brother    5  Family history of hypertension (V17 49) (Z82 49)  Family History    6  Denied: Family history of drug addiction   7  Family history of mental disorder (V17 0) (Z81 8)    Social History    · Inadequate exercise (V69 0) (Z72 3)   · Lives with adult children   · Never a smoker   · No alcohol use   · No illicit drug use   · Non-smoker (V49 89) (Z78 9)   · Hungarian speaking patient (V40 1) (Z78 9)    Current Meds    1  Blood Pressure KIT; CHECK BLOOD PRESSURE 1-2 TIMES PER WEEK OR AS   DIRECTED; Therapy: 69FFD1106 to (Last Rx:19Oct2017) Ordered   2  Metoprolol Succinate ER 50 MG Oral Tablet Extended Release 24 Hour; TAKE 1 TABLET   BY MOUTH ONCE A DAY; Therapy: 43JKA9737 to (Evaluate:11Jan2018)  Requested for: 17IND1044; Last   Rx:13Oct2017 Ordered    3  Losartan Potassium 50 MG Oral Tablet; TAKE 1 TABLET DAILY; Therapy: 31Ruf2343 to (Evaluate:95Xpb8779)  Requested for: 19HXL4535; Last   Rx:19Oct2017 Ordered    4  Gabapentin 100 MG Oral Capsule; TAKE 1 CAPSULE EVERY DAY AT BEDTIME FOR 1   WEEK THEN INCREASE TO 2 CAPSULES AT BEDTIME; Therapy: 75MCP6657 to [phone])  Requested for: 94Jzn7595; Last   Rx:71Eis2880 Ordered    5  FreeStyle Lancets Miscellaneous; TEST 1 TIME DAILY;    Therapy: 98MYK5211 to (Last Rx:25Sep2017) Requested for: 58TVI4777 Ordered   6  FreeStyle Lite Device; USE AS DIRECTED; Therapy: 24YLK6187 to (Last Selina )  Requested for: 22Nyd5912 Ordered   7  FreeStyle Lite Test In Vitro Strip; TEST ONCE DAILY; Therapy: 94XSK5454 to (Last Tonie Denise)  Requested for: 27DPW4554 Ordered   8  MetFORMIN HCl - 500 MG Oral Tablet; Take 1 tablet twice daily; Therapy: 56FRP2157 to (21 234 )  Requested for: 86Lyo6737; Last   Rx:24Eyk4659 Ordered    9  Aspirin 81 MG Oral Tablet Delayed Release; TAKE 1 TABLET DAILY; Therapy: 15NVM7673 to (Evaluate:02Aug2017)  Requested for: 43KRN7053; Last   TL:53ABI1881 Ordered    10  Simvastatin 40 MG Oral Tablet; take 1 tablet by mouth every evening; Therapy: 36Qry9284 to (Evaluate:11Jan2018)  Requested for: 13Oct2017; Last    Rx:48Tpl5467 Ordered    11  Azelastine HCl - 0 1 % Nasal Solution; USE 1 SPRAY IN EACH NOSTRIL TWICE DAILY; Therapy: 85JKE4210 to (Last Rx:19Oct2017)  Requested for: 06Mox0601 Ordered   12  Benzonatate 100 MG Oral Capsule; TAKE 1 CAPSULE EVERY 8 HOURS AS NEEDED; Therapy: 42PBE3677 to (21 234 )  Requested for: 84ZMD4747; Last    Rx:95Xsr5981 Ordered    13  Carpal Tunnel Wrist Stabilizer Miscellaneous; USE AS DIRECTED EVERY DAY FOR 6-8    WEEKS; Therapy: 60Ihx8272 to (Last Rx:86Heo8368) Ordered    Allergies    1  No Known Drug Allergies    2  No Known Environmental Allergies   3  No Known Food Allergies    End of Encounter Meds    1  Blood Pressure KIT; CHECK BLOOD PRESSURE 1-2 TIMES PER WEEK OR AS   DIRECTED; Therapy: 88SWC0494 to (Last Rx:19Oct2017) Ordered   2  Metoprolol Succinate ER 50 MG Oral Tablet Extended Release 24 Hour; TAKE 1 TABLET   BY MOUTH ONCE A DAY; Therapy: 96KLN7520 to (Evaluate:11Jan2018)  Requested for: 56AZM5656; Last   Rx:52Jep8864 Ordered    3  Losartan Potassium 50 MG Oral Tablet; TAKE 1 TABLET DAILY;    Therapy: 90Iwu4848 to (Evaluate:19Jsc9173)  Requested for: 98GDJ1402; Last   Rx:19Oct2017 Ordered    4  Gabapentin 100 MG Oral Capsule; TAKE 1 CAPSULE EVERY DAY AT BEDTIME FOR 1   WEEK THEN INCREASE TO 2 CAPSULES AT BEDTIME; Therapy: 32PQG6854 to (39 [phone])  Requested for: 64Ntl2383; Last   Rx:55Ykl6658 Ordered    5  FreeStyle Lancets Miscellaneous; TEST 1 TIME DAILY; Therapy: 76WMS9801 to (Last Caren Erb)  Requested for: 02SDA9384 Ordered   6  FreeStyle Lite Device; USE AS DIRECTED; Therapy: 68XPW9429 to (Last Caren Erb)  Requested for: 32Kyx2538 Ordered   7  FreeStyle Lite Test In Vitro Strip; TEST ONCE DAILY; Therapy: 03NBT6375 to (Last Caren Erb)  Requested for: 15MJT9941 Ordered   8  MetFORMIN HCl - 500 MG Oral Tablet; Take 1 tablet twice daily; Therapy: 25RAP7028 to (49 873 135)  Requested for: 29Zmz7321; Last   Rx:48Mhp2154 Ordered    9  Aspirin 81 MG Oral Tablet Delayed Release; TAKE 1 TABLET DAILY; Therapy: 10UDR0183 to (Evaluate:20Azp1601)  Requested for: 75EJI8085; Last   OK:25XCQ5121 Ordered    10  Simvastatin 40 MG Oral Tablet; take 1 tablet by mouth every evening; Therapy: 94Iad9685 to (Evaluate:11Jan2018)  Requested for: 13Oct2017; Last    Rx:13Oct2017 Ordered    11  Azelastine HCl - 0 1 % Nasal Solution; USE 1 SPRAY IN EACH NOSTRIL TWICE DAILY; Therapy: 34QCE8637 to (Last Rx:19Oct2017)  Requested for: 19Oct2017 Ordered   12  Benzonatate 100 MG Oral Capsule; TAKE 1 CAPSULE EVERY 8 HOURS AS NEEDED; Therapy: 31SWV9578 to (14 [phone])  Requested for: 99NSX9858; Last    Rx:19Oct2017 Ordered    13  Carpal Tunnel Wrist Stabilizer Miscellaneous; USE AS DIRECTED EVERY DAY FOR 6-8    WEEKS; Therapy: 46Vmr0887 to (Last Rx:40Uzs5543) Ordered    Future Appointments    Date/Time Provider Specialty Site   11/08/2017 08:30 AM MERLINE Yo   Orthopedic Surgery [facility]   11/16/2017 01:30 PM Shabbir Munguia DO Internal Medicine [facility]     Patient Care Team    Care Team Member Role Specialty Office Number   Mukund Brown [address] [facility] [phone]   Kelsi Corona   [phone]   [facility]  Nurse Practitioner [phone]     Signatures   Electronically signed by : Crys Larkin; Oct 19 2017  3:19PM EST                       (Author)

## 2018-01-12 VITALS
RESPIRATION RATE: 16 BRPM | TEMPERATURE: 98.2 F | HEART RATE: 55 BPM | DIASTOLIC BLOOD PRESSURE: 82 MMHG | HEIGHT: 70 IN | BODY MASS INDEX: 24.62 KG/M2 | WEIGHT: 172 LBS | OXYGEN SATURATION: 98 % | SYSTOLIC BLOOD PRESSURE: 112 MMHG

## 2018-01-13 VITALS
HEART RATE: 100 BPM | BODY MASS INDEX: 25.07 KG/M2 | OXYGEN SATURATION: 98 % | HEIGHT: 70 IN | SYSTOLIC BLOOD PRESSURE: 126 MMHG | WEIGHT: 175.13 LBS | DIASTOLIC BLOOD PRESSURE: 82 MMHG | TEMPERATURE: 97.6 F

## 2018-01-13 VITALS
HEIGHT: 70 IN | HEART RATE: 80 BPM | WEIGHT: 183.42 LBS | DIASTOLIC BLOOD PRESSURE: 90 MMHG | TEMPERATURE: 98.2 F | BODY MASS INDEX: 26.26 KG/M2 | SYSTOLIC BLOOD PRESSURE: 122 MMHG

## 2018-01-13 NOTE — RESULT NOTES
Verified Results  (1) HEMOGLOBIN A1C 25Oct2017 10:10AM Nena Lovett Order Number: [id number]     Test Name Result Flag Reference   HEMOGLOBIN A1C 5 4 %  4 2-6 3   EST  AVG  GLUCOSE 108 mg/dl       (1) COMPREHENSIVE METABOLIC PANEL 37GBM5920 04:28HR Bianca Aldridge   TW Order Number: [id number]     Test Name Result Flag Reference   GLUCOSE,RANDM 206 mg/dL H    If the patient is fasting, the ADA then defines impaired fasting glucose as > 100 mg/dL and diabetes as > or equal to 123 mg/dL  Specimen collection should occur prior to Sulfasalazine administration due to the potential for falsely depressed results  Specimen collection should occur prior to Sulfapyridine administration due to the potential for falsely elevated results  SODIUM 138 mmol/L  136-145   POTASSIUM 3 9 mmol/L  3 5-5 3   CHLORIDE 103 mmol/L  100-108   CARBON DIOXIDE 26 mmol/L  21-32   ANION GAP (CALC) 9 mmol/L  4-13   BLOOD UREA NITROGEN 10 mg/dL  5-25   CREATININE 0 89 mg/dL  0 60-1 30   Standardized to IDMS reference method   CALCIUM 8 5 mg/dL  8 3-10 1   BILI, TOTAL 0 90 mg/dL  0 20-1 00   ALK PHOSPHATAS 88 U/L     ALT (SGPT) 39 U/L  12-78   Specimen collection should occur prior to Sulfasalazine administration due to the potential for falsely depressed results  AST(SGOT) 24 U/L  5-45   Specimen collection should occur prior to Sulfasalazine administration due to the potential for falsely depressed results  ALBUMIN 3 4 g/dL L 3 5-5 0   TOTAL PROTEIN 7 4 g/dL  6 4-8 2   eGFR 91 ml/min/1 73sq m     National Kidney Disease Education Program recommendations are as follows:  GFR calculation is accurate only with a steady state creatinine  Chronic Kidney disease less than 60 ml/min/1 73 sq  meters  Kidney failure less than 15 ml/min/1 73 sq  meters

## 2018-01-13 NOTE — MISCELLANEOUS
Provider Comments  Provider Comments:   PT WAS A NO SHOW FOR HIS OV WITH DR RAIN ON 07/27/2017 / Dayna Kaur      Signatures   Electronically signed by : Kleber Pelletier DO; Aug  1 2017  1:41PM EST                       (Author)

## 2018-01-14 VITALS
HEIGHT: 70 IN | DIASTOLIC BLOOD PRESSURE: 80 MMHG | TEMPERATURE: 98 F | SYSTOLIC BLOOD PRESSURE: 130 MMHG | HEART RATE: 60 BPM | BODY MASS INDEX: 25.69 KG/M2 | WEIGHT: 179.45 LBS

## 2018-01-14 VITALS
SYSTOLIC BLOOD PRESSURE: 130 MMHG | HEART RATE: 80 BPM | WEIGHT: 182.1 LBS | HEIGHT: 70 IN | DIASTOLIC BLOOD PRESSURE: 88 MMHG | BODY MASS INDEX: 26.07 KG/M2 | TEMPERATURE: 98.6 F

## 2018-01-14 VITALS
RESPIRATION RATE: 18 BRPM | BODY MASS INDEX: 30.22 KG/M2 | HEART RATE: 66 BPM | OXYGEN SATURATION: 97 % | DIASTOLIC BLOOD PRESSURE: 85 MMHG | HEIGHT: 66 IN | WEIGHT: 188 LBS | TEMPERATURE: 97.9 F | SYSTOLIC BLOOD PRESSURE: 138 MMHG

## 2018-01-14 VITALS
HEART RATE: 64 BPM | DIASTOLIC BLOOD PRESSURE: 78 MMHG | HEIGHT: 70 IN | WEIGHT: 176 LBS | SYSTOLIC BLOOD PRESSURE: 124 MMHG | BODY MASS INDEX: 25.2 KG/M2 | RESPIRATION RATE: 16 BRPM

## 2018-01-14 VITALS — DIASTOLIC BLOOD PRESSURE: 110 MMHG | HEART RATE: 60 BPM | SYSTOLIC BLOOD PRESSURE: 198 MMHG

## 2018-01-14 VITALS
OXYGEN SATURATION: 98 % | BODY MASS INDEX: 25.12 KG/M2 | HEIGHT: 70 IN | WEIGHT: 175.44 LBS | DIASTOLIC BLOOD PRESSURE: 82 MMHG | TEMPERATURE: 97.9 F | HEART RATE: 68 BPM | SYSTOLIC BLOOD PRESSURE: 132 MMHG | RESPIRATION RATE: 18 BRPM

## 2018-01-14 NOTE — PROGRESS NOTES
Chief Complaint  pt is here for BP check, complains of small amount of chest pain      Active Problems    1  Absent pedal pulses (785 9) (R09 89)   2  Atherosclerosis of native artery of both lower extremities with intermittent claudication   (440 21) (I70 213)   3  Benign essential hypertension (401 1) (I10)   4  Carpal tunnel syndrome, right (354 0) (G56 01)   5  Cervical spondylolysis (756 19) (M43 02)   6  Controlled type 2 diabetes mellitus with diabetic neuropathy, without long-term current   use of insulin (250 60,357 2) (E11 40)   7  Coronary artery disease (414 00) (I25 10)   8  Dry mouth (527 7) (R68 2)   9  Elevated total protein (790 99) (R77 8)   10  Glucosuria (791 5) (R81)   11  Hyperlipidemia (272 4) (E78 5)   12  Incomplete bladder emptying (788 21) (R33 9)   13  Need for immunization against influenza (V04 81) (Z23)   14  Other constipation (564 09) (K59 09)   15  Peripheral arterial disease (443 9) (I73 9)   16  Posterior neck pain (723 1) (M54 2)   17  Radicular pain (729 2) (M54 10)   18  Seasonal allergies (477 9) (J30 2)   19  Severe carpal tunnel syndrome of right wrist (354 0) (G56 01)   20  Urine troubles (V47 4) (R39 89)    Current Meds   1  Aspirin 81 MG Oral Tablet Delayed Release; TAKE 1 TABLET DAILY; Therapy: 34IEG1327 to (Evaluate:06Dmk4645)  Requested for: 69DMU9018; Last   CK:41TDN3582 Ordered   2  Carpal Tunnel Wrist Stabilizer Miscellaneous; USE AS DIRECTED EVERY DAY FOR 6-8   WEEKS; Therapy: 04Edl6099 to (Last Rx:77Qie2063) Ordered   3  FreeStyle Lancets Miscellaneous; TEST 1 TIME DAILY; Therapy: 12EOZ0073 to (Last Giovannatom Case)  Requested for: 43Oua8896 Ordered   4  FreeStyle Lite Device; USE AS DIRECTED; Therapy: 37EFT1053 to (Last Giovannachirag Case)  Requested for: 61VXO1530 Ordered   5  FreeStyle Lite Test In Vitro Strip; TEST ONCE DAILY; Therapy: 88HXP8481 to (Last Giovanna Case)  Requested for: 92EQT3994 Ordered   6   Gabapentin 100 MG Oral Capsule; TAKE 1 CAPSULE EVERY DAY AT BEDTIME FOR 1   WEEK THEN INCREASE TO 2 CAPSULES AT BEDTIME; Therapy: 21LKN5719 to (96 035821)  Requested for: 69Uzr4350; Last   Rx:29Ish0010 Ordered   7  Losartan Potassium 25 MG Oral Tablet; TAKE 1 TABLET BY MOUTH ONCE A DAY AS   DIRECTED; Therapy: 60Jnc4549 to (96 412665)  Requested for: 41Bua8223; Last   Rx:96Den0745 Ordered   8  MetFORMIN HCl - 500 MG Oral Tablet; Take 1 tablet twice daily; Therapy: 58KZN3830 to (96 183864)  Requested for: 36Hnu3169; Last   Rx:12Rfr1836 Ordered   9  Metoprolol Succinate ER 50 MG Oral Tablet Extended Release 24 Hour; TAKE 1 TABLET   BY MOUTH ONCE A DAY; Therapy: 77NFY1586 to (96 915723)  Requested for: 46Lng9486; Last   Rx:66Alj6697 Ordered   10  Simvastatin 40 MG Oral Tablet; take 1 tablet by mouth every evening; Therapy: 67Xxg2294 to (Evaluate:51Ysu8622)  Requested for: 66Ego0945; Last    Rx:18Gdi9417 Ordered    Allergies    1  No Known Drug Allergies    2  No Known Environmental Allergies   3  No Known Food Allergies    Vitals  Signs    Heart Rate: 60  Systolic: 970, RUE, Sitting  Diastolic: 622, RUE, Sitting    Plan  Benign essential hypertension    · Metoprolol Succinate ER 50 MG Oral Tablet Extended Release 24 Hour; TAKE 1  TABLET BY MOUTH ONCE A DAY  Benign essential hypertension, Coronary artery disease    · Losartan Potassium 100 MG Oral Tablet; TAKE 1 TABLET DAILY  Coronary artery disease, Hyperlipidemia    · Simvastatin 40 MG Oral Tablet; take 1 tablet by mouth every evening    Discussion/Summary    He reports chest pain is his normal, about the same as always  Denies any headache, dizziness or nausea  He has been taking his medications properly, took 2 of the losartan a few days ago as instructed  Informed him he missed his appt with cardiology yesterday  Increase losartan to 100 mg daily, continue metoprolol  He asked for a refill of his statin          Future Appointments    Date/Time Provider Specialty Site   11/08/2017 08:30 AM MERLINE Haro   Orthopedic Surgery [facility]   10/17/2017 11:00 AM Cecilia Cook DO Internal Medicine [facility] INTERNAL MED     Signatures   Electronically signed by : Michell Deras, ; Oct 13 2017  1:25PM EST                       (Co-author)    Electronically signed by : Wing Azalia MD; Oct 13 2017  4:55PM EST                       (Author)

## 2018-01-15 NOTE — RESULT NOTES
Verified Results  (1) PROTEIN ELECTRO, SERUM 84Qdl5821 01:00PM Vicenta Corona   TW Order Number: [id number]     Test Name Result Flag Reference   A/G RATIO 1 19  1 10-1 80   Albumin 54 4 %  52 0-65 0   Albumin Conc  4 08 g/dl  3 50-5 00   Alpha 1 Conc  0 24 g/dL  0 10-0 40   ALPHA 1 3 2 %  2 5-5 0   Alpha 2 Conc  0 65 g/dL  0 40-1 20   ALPHA 2 8 7 %  7 0-13 0   Beta 1 Conc  0 41 g/dL  0 40-0 80   BETA-1 5 5 %  5 0-13 0   Beta 2 Conc 0 34 g/dL  0 20-0 50   BETA-2 4 5 %  2 0-8 0   Gamma Conc 1 78 g/dL H 0 50-1 60   GAMMA GLOBULIN 23 7 % H 12 0-22 0   Interpretation (Report)     The serum total protein and albumin are within normal limits  The serum protein electrophoresis shows a polyclonal increase of the gamma region with a faint possible underlying band  Immunofixation to be performed  Reviewed by: Jenaro Bishop MD ([id number]) **Electronic Signature**   TOTAL PROTEIN  7 5 g/dL  6 4-8 2     (1) CBC/PLT/DIFF 88Yaw9206 01:00PM Vicenta Corona     Test Name Result Flag Reference   WBC COUNT 7 38 Thousand/uL  4 31-10 16   RBC COUNT 4 65 Million/uL  3 88-5 62   HEMOGLOBIN 14 8 g/dL  12 0-17 0   HEMATOCRIT 41 9 %  36 5-49 3   MCV 90 fL  82-98   MCH 31 8 pg  26 8-34 3   MCHC 35 3 g/dL  31 4-37 4   RDW 12 6 %  11 6-15 1   MPV 12 1 fL  8 9-12 7   PLATELET COUNT 398 Thousands/uL  149-390   NEUTROPHILS RELATIVE PERCENT 60 %  43-75   LYMPHOCYTES RELATIVE PERCENT 31 %  14-44   MONOCYTES RELATIVE PERCENT 6 %  4-12   EOSINOPHILS RELATIVE PERCENT 2 %  0-6   BASOPHILS RELATIVE PERCENT 1 %  0-1   NEUTROPHILS ABSOLUTE COUNT 4 46 Thousands/? ??L  1 85-7 62   LYMPHOCYTES ABSOLUTE COUNT 2 28 Thousands/? ??L  0 60-4 47   MONOCYTES ABSOLUTE COUNT 0 43 Thousand/? ??L  0 17-1 22   EOSINOPHILS ABSOLUTE COUNT 0 14 Thousand/? ??L  0 00-0 61   BASOPHILS ABSOLUTE COUNT 0 07 Thousands/? ??L  0 00-0 10     (1) HIV AG/AB César Fox, 4TH GEN 52FQW6591 01:00PM Vicenta KIRKPATRICK Order Number: [id number]     Test Name Result Flag Reference   HIV 1/2 AND P24 Non-Reactive  Non-Reactive   This test detects HIV 1, HIV2 and p24 Antigen  (1) HEP C ANTIBODY 99Wjt9193 01:00PM Dayna Gonzalez    Order Number: [id number]     Test Name Result Flag Reference   HEPATITIS C ANTIBODY Non-reactive  Non-reactive       Plan  Benign essential hypertension    · Blood Pressure KIT; CHECK BLOOD PRESSURE 1-2 TIMES PER WEEK OR AS  DIRECTED  Benign essential hypertension, Coronary artery disease    · Losartan Potassium 50 MG Oral Tablet; TAKE 1 TABLET DAILY  Benign essential hypertension, Elevated total protein    · (1) COMPREHENSIVE METABOLIC PANEL; Status:Active; Requested DCU:77ZJQ4836; Controlled type 2 diabetes mellitus with diabetic neuropathy, without long-term current  use of insulin    · (1) HEMOGLOBIN A1C; Status:Active;  Requested for:19Oct2017;   Dry cough    · Azelastine HCl - 0 1 % Nasal Solution; USE 1 SPRAY IN EACH NOSTRIL TWICE  DAILY   · Benzonatate 100 MG Oral Capsule; TAKE 1 CAPSULE EVERY 8 HOURS AS  NEEDED  Need for pneumococcal vaccination    · Pneumo (Pneumovax)

## 2018-01-15 NOTE — PROGRESS NOTES
Chief Complaint  Patient was here to be taught how to use his BS meter  Active Problems    1  Absent pedal pulses (785 9) (R09 89)   2  Atherosclerosis of native artery of both lower extremities with intermittent claudication   (440 21) (I70 213)   3  Benign essential hypertension (401 1) (I10)   4  Carpal tunnel syndrome, right (354 0) (G56 01)   5  Cervical spondylolysis (756 19) (M43 02)   6  Controlled type 2 diabetes mellitus with diabetic neuropathy, without long-term current   use of insulin (250 60,357 2) (E11 40)   7  Coronary artery disease (414 00) (I25 10)   8  Dry cough (786 2) (R05)   9  Dry mouth (527 7) (R68 2)   10  Elevated total protein (790 99) (R77 8)   11  Glucosuria (791 5) (R81)   12  Hyperlipidemia (272 4) (E78 5)   13  Incomplete bladder emptying (788 21) (R33 9)   14  Need for immunization against influenza (V04 81) (Z23)   15  Need for pneumococcal vaccination (V03 82) (Z23)   16  Other constipation (564 09) (K59 09)   17  Peripheral arterial disease (443 9) (I73 9)   18  Posterior neck pain (723 1) (M54 2)   19  Radicular pain (729 2) (M54 10)   20  Seasonal allergies (477 9) (J30 2)   21  Severe carpal tunnel syndrome of right wrist (354 0) (G56 01)   22  Urine troubles (V47 4) (R39 89)    Current Meds   1  Aspirin 81 MG Oral Tablet Delayed Release; TAKE 1 TABLET DAILY; Therapy: 98GBG9361 to (Evaluate:88Meu4957)  Requested for: 03NMW1957; Last   VB:14GVH8911 Ordered   2  Atorvastatin Calcium 20 MG Oral Tablet; Take 1 tablet daily; Therapy: 31BQE6576 to (Evaluate:58Eza9010)  Requested for: 13HRN8188; Last   Rx:16Nov2017 Ordered   3  Blood Pressure KIT; CHECK BLOOD PRESSURE 1-2 TIMES PER WEEK OR AS   DIRECTED; Therapy: 99VLS6010 to (Last Rx:19Oct2017) Ordered   4  Carpal Tunnel Wrist Stabilizer Miscellaneous; USE AS DIRECTED EVERY DAY FOR 6-8   WEEKS; Therapy: 39Ukr5893 to (Last Rx:13Sep2017) Ordered   5  FreeStyle Lancets Miscellaneous; TEST 1 TIME DAILY;    Therapy: 03XNY4668 to (Last Autumn Dorman)  Requested for: 75Bnn9280 Ordered   6  FreeStyle Lite Device; USE AS DIRECTED; Therapy: 29ORX6928 to (Last Autumn Dorman)  Requested for: 75Bwb2684 Ordered   7  FreeStyle Lite Test In Vitro Strip; TEST ONCE DAILY; Therapy: 11PLR7013 to (Last Autumn Dandy)  Requested for: 46GVU5913 Ordered   8  Gabapentin 100 MG Oral Capsule; TAKE 1 CAPSULE EVERY DAY AT BEDTIME FOR 1   WEEK THEN INCREASE TO 2 CAPSULES AT BEDTIME; Therapy: 53LKZ0226 to ()  Requested for: 55Ntn3732; Last   Rx:73Uvl4719 Ordered   9  Losartan Potassium 50 MG Oral Tablet; TAKE 1 TABLET DAILY; Therapy: 54Odj1357 to (Evaluate:01Ywi7295)  Requested for: 54KLG9994; Last   Rx:44Qof7739 Ordered   10  Metoprolol Succinate ER 50 MG Oral Tablet Extended Release 24 Hour; TAKE 1 TABLET    BY MOUTH ONCE A DAY; Therapy: 75EXF3203 to (Evaluate:36Quq1817)  Requested for: 72WZT1896; Last    Rx:02Win6566 Ordered    Allergies    1  No Known Drug Allergies    2  No Known Environmental Allergies   3   No Known Food Allergies    Future Appointments    Date/Time Provider Specialty Site   12/28/2017 02:00 PM Vannesa Mackey DO Internal Medicine [facility] INTERNAL MED     Signatures   Electronically signed by : Bell Kraus; Nov 21 2017  3:16PM EST                       (Co-author)    Electronically signed by : Nirmal Ashby DO; Nov 21 2017  3:21PM EST                       (Author)

## 2018-01-16 NOTE — RESULT NOTES
Verified Results  (1) COMPREHENSIVE METABOLIC PANEL 73TDF3520 16:81OC Jamison Pritchett    Order Number: [id number]     Test Name Result Flag Reference   GLUCOSE,RANDM 91 mg/dL     If the patient is fasting, the ADA then defines impaired fasting glucose as > 100 mg/dL and diabetes as > or equal to 123 mg/dL  Specimen collection should occur prior to Sulfasalazine administration due to the potential for falsely depressed results  Specimen collection should occur prior to Sulfapyridine administration due to the potential for falsely elevated results  SODIUM 138 mmol/L  136-145   POTASSIUM 3 6 mmol/L  3 5-5 3   CHLORIDE 101 mmol/L  100-108   CARBON DIOXIDE 28 mmol/L  21-32   ANION GAP (CALC) 9 mmol/L  4-13   BLOOD UREA NITROGEN 18 mg/dL  5-25   CREATININE 0 91 mg/dL  0 60-1 30   Standardized to IDMS reference method   CALCIUM 9 2 mg/dL  8 3-10 1   BILI, TOTAL 1 30 mg/dL H 0 20-1 00   ALK PHOSPHATAS 92 U/L     ALT (SGPT) 38 U/L  12-78   Specimen collection should occur prior to Sulfasalazine administration due to the potential for falsely depressed results  AST(SGOT) 26 U/L  5-45   Specimen collection should occur prior to Sulfasalazine administration due to the potential for falsely depressed results  ALBUMIN 3 8 g/dL  3 5-5 0   TOTAL PROTEIN 8 4 g/dL H 6 4-8 2   eGFR 89 ml/min/1 73sq m     National Kidney Disease Education Program recommendations are as follows:  GFR calculation is accurate only with a steady state creatinine  Chronic Kidney disease less than 60 ml/min/1 73 sq  meters  Kidney failure less than 15 ml/min/1 73 sq  meters  (1) HEMOGLOBIN A1C 24Aug2017 01:42PM Jamison Pritchett    Order Number: [id number]     Test Name Result Flag Reference   HEMOGLOBIN A1C 5 3 %  4 2-6 3   EST  AVG  GLUCOSE 105 mg/dl         Plan  Elevated total protein    · (1) CBC/PLT/DIFF; Status:Active; Requested for:24Aug2017;    · (1) HIV AG/AB COMBO, 4TH GEN; [Do Not Release];  Status:Active; Requested  for:14Jxx1737;    · (1) PROTEIN ELECTRO, SERUM; Status:Active; Requested for:24Bqo4889;    · (LC) Bilirubin, Total/Direct, Serum; Status:Active; Requested for:79Uvx4293;    · (Q) PROTEIN, TOTAL AND PROTEIN ELECTROPHORESIS, RANDOM URINE;  Status:Active;  Requested for:24Aug2017;

## 2018-01-16 NOTE — PROGRESS NOTES
History of Present Illness  Care Coordination Encounter Information:   Type of Encounter: Telephonic   Contact: Follow-Up        Care Coordination  Nurse Jostin Staff:   The reason for call is to discuss outreach for follow up/needed services  Follow up call to assess progress and any additional needs or concerns  Four unsuccessful attempts made to both patient and son, Prashanth Santos  Left messages with my contact information on each attempt to return call  Active Problems    1  Absent pedal pulses (785 9) (R09 89)   2  Benign essential hypertension (401 1) (I10)   3  Carpal tunnel syndrome, right (354 0) (G56 01)   4  Cervical spondylolysis (756 19) (M43 02)   5  Controlled type 2 diabetes mellitus with diabetic neuropathy, without long-term current   use of insulin (250 60,357 2) (E11 40)   6  Coronary artery disease (414 00) (I25 10)   7  Dry mouth (527 7) (R68 2)   8  Elevated total protein (790 99) (R77 8)   9  Glucosuria (791 5) (R81)   10  Hyperlipidemia (272 4) (E78 5)   11  Incomplete bladder emptying (788 21) (R33 9)   12  Other constipation (564 09) (K59 09)   13  Posterior neck pain (723 1) (M54 2)   14  Radicular pain (729 2) (M54 10)   15  Seasonal allergies (477 9) (J30 2)   16  Severe carpal tunnel syndrome of right wrist (354 0) (G56 01)   17  Urine troubles (V47 4) (R39 89)    Past Medical History    1  No pertinent past medical history    Surgical History    1  History of CABG    Family History  Mother    1  Family history of diabetes mellitus (V18 0) (Z83 3)   2  Family history of heart disease (V17 49) (Z82 49)  Father    3  Family history of diabetes mellitus (V18 0) (Z83 3)   4  Family history of heart disease (V17 49) (Z82 49)  Brother    5  Family history of hypertension (V17 49) (Z82 49)  Family History    6  Denied: Family history of drug addiction   7   Family history of mental disorder (V17 0) (Z81 8)    Social History    · Inadequate exercise (V69 0) (Z72 3)   · Lives with adult children · No alcohol use   · No illicit drug use   · Non-smoker (V49 89) (Z78 9)   · [place]orese speaking patient (V40 1) (Z78 9)    Current Meds    1  Metoprolol Succinate ER 50 MG Oral Tablet Extended Release 24 Hour; TAKE 1 TABLET   BY MOUTH ONCE A DAY; Therapy: 53HUN4561 to (96 951133)  Requested for: 78Yqc0023; Last   Rx:31Wsx5023 Ordered    2  Losartan Potassium 25 MG Oral Tablet; TAKE 1 TABLET BY MOUTH ONCE A DAY AS   DIRECTED; Therapy: 19Ubc3901 to (96 876974)  Requested for: 58Zbj4127; Last   Rx:23Xtn8602 Ordered    3  Gabapentin 100 MG Oral Capsule; TAKE 1 CAPSULE EVERY DAY AT BEDTIME FOR 1   WEEK THEN INCREASE TO 2 CAPSULES AT BEDTIME; Therapy: 48GDC8792 to (96 540380)  Requested for: 38Ucw2664; Last   Rx:33Smg8768 Ordered    4  MetFORMIN HCl - 500 MG Oral Tablet; Take 1 tablet twice daily; Therapy: 67ZJI8947 to (96 181499)  Requested for: 89Otn4194; Last   Rx:19Gmw4663 Ordered    5  Accu-Chek FastClix Lancets Miscellaneous; use 1 lancet daily to check for serum   glucose; Therapy: 35LIJ3452 to (Last Rx:19May2017)  Requested for: 53EPA2746 Ordered   6  Accu-Chek Maylin SmartView w/Device Kit; use glucometer to check you serum glucose,   once daily; Therapy: 67HEP7133 to (Last Rx:19May2017)  Requested for: 09HHH7138 Ordered   7  Accu-Chek Soft Touch Lancets Miscellaneous; for 1x/day glucose testing; Therapy: 27YGW5001 to (Last Rx:19May2017)  Requested for: 87YQK7163 Ordered    8  Aspirin 81 MG Oral Tablet Delayed Release; TAKE 1 TABLET DAILY; Therapy: 60HYN3423 to (Evaluate:50Tck0448)  Requested for: 49KST1611; Last   IZ:22XGB2974 Ordered    9  Simvastatin 40 MG Oral Tablet; take 1 tablet by mouth every evening; Therapy: 40Gea1448 to (Evaluate:34Aei8821)  Requested for: 38Blq3651; Last   Rx:05Fxi8938 Ordered    10  Carpal Tunnel Wrist Stabilizer Miscellaneous; USE AS DIRECTED EVERY DAY FOR 6-8    WEEKS;     Therapy: 13Sep2017 to (Last Rx:13Sep2017) Ordered    Allergies    1  No Known Drug Allergies    2  No Known Environmental Allergies   3  No Known Food Allergies    End of Encounter Meds    1  Metoprolol Succinate ER 50 MG Oral Tablet Extended Release 24 Hour; TAKE 1 TABLET   BY MOUTH ONCE A DAY; Therapy: 66AKV4720 to (96 312710)  Requested for: 54Zgw5178; Last   Rx:65Vta7177 Ordered    2  Losartan Potassium 25 MG Oral Tablet; TAKE 1 TABLET BY MOUTH ONCE A DAY AS   DIRECTED; Therapy: 77Tqy6519 to (96 360347)  Requested for: 61Fra2297; Last   Rx:01Hiu3461 Ordered    3  Gabapentin 100 MG Oral Capsule; TAKE 1 CAPSULE EVERY DAY AT BEDTIME FOR 1   WEEK THEN INCREASE TO 2 CAPSULES AT BEDTIME; Therapy: 40XJG0791 to (96 254471)  Requested for: 99Kis7106; Last   Rx:58Qdp1036 Ordered    4  MetFORMIN HCl - 500 MG Oral Tablet; Take 1 tablet twice daily; Therapy: 27TSB8451 to (96 596113)  Requested for: 21Ing2255; Last   Rx:99Pym9032 Ordered    5  Accu-Chek FastClix Lancets Miscellaneous; use 1 lancet daily to check for serum   glucose; Therapy: 13FWG9364 to (Last Rx:19May2017)  Requested for: 51HIH9371 Ordered   6  Accu-Chek Maylin SmartView w/Device Kit; use glucometer to check you serum glucose,   once daily; Therapy: 08LXJ2905 to (Last Rx:19May2017)  Requested for: 67LJO4028 Ordered   7  Accu-Chek Soft Touch Lancets Miscellaneous; for 1x/day glucose testing; Therapy: 06AFF4827 to (Last Rx:19May2017)  Requested for: 61PRX0037 Ordered    8  Aspirin 81 MG Oral Tablet Delayed Release; TAKE 1 TABLET DAILY; Therapy: 31STT7931 to (Evaluate:84Vto2554)  Requested for: 17RTT3899; Last   UF:70MUE3378 Ordered    9  Simvastatin 40 MG Oral Tablet; take 1 tablet by mouth every evening; Therapy: 99Rkf3429 to (Evaluate:33Ynk6374)  Requested for: 68Miv4636; Last   Rx:91Izo8074 Ordered    10  Carpal Tunnel Wrist Stabilizer Miscellaneous; USE AS DIRECTED EVERY DAY FOR 6-8    WEEKS;     Therapy: 13Sep2017 to (Last Rx:13Sep2017) Ordered    Future Appointments    Date/Time Provider Specialty Site   09/25/2017 11:00 AM Bianca Aldridge DO Internal Medicine [facility] INTERNAL MED     Message   Recorded as Task   Date: 09/06/2017 10:58 AM, Created By: Omkar Saenz   Task Name: Call Back   Assigned To: Omkar Saenz   Regarding Patient: Flor Orozco, Status: In Progress   Chinyere Mom - 06 Sep 2017 10:58 AM     TASK CREATED  Follow up call  Second attempt made to contact patient   used # [id number]  There was no answer  Left voice message with my contact information to return call  First attempt made on 8/31/17   used #[id number]  There was no answer/no machine at that time  Omkar Saenz - 06 Sep 2017 10:58 AM     TASK IN PROGRESS   Omkar Saenz - 12 Sep 2017 1:05 PM     TASK EDITED  Third attempt made  Contacted patient's son, Elsa Russell  States will call me back in 20 min to further discuss  CastroClair rahman - 13 Sep 2017 10:09 AM     TASK EDITED  Elsa Russell, patient's son, did not return phone call yesterday  Called Nahid at this time  Left voice message to remind him of his Denny's outpatient tests scheduled for today at 100pm at [facility]  My contact information provided again on machine to return call       Patient Care Team    Care Team Member Role Specialty Office Number   [facility] Daniel [phone]   Halle Ramsay   [phone]     Signatures   Electronically signed by : Marion Price; Sep 14 2017  3:45PM EST                       (Author)

## 2018-01-17 NOTE — PROGRESS NOTES
History of Present Illness  Care Coordination Encounter Information:   Type of Encounter: In Office   Contact: Follow-Up    Spoke to Patient and Other    present in room  social mike Watts, present in room  Care Coordination  Nurse [facility]:   The reason for call is to discuss outreach for follow up/needed services  Doing good  All prescriptions filled  Denies any further concerns obtaining medications  [facility] a couple of times per week  States that his son transports him and he is provided lunch  Previously was sent further resources by social mike Watts, regarding social activities/locations, but patient unsure what he did with the papers  Altamese Hughes service denied as patient needed to provide proof of transportation assistance through medical assistance, which patient does not know if he has  tSephen Barker spoke with patient regarding applying for a different medical assistance plan, and patient agreeable  Resources for social activity locations and application for medical assistance plan will be provided to office staff to provide to patient  Patient agreeable  Denies any further questions or concerns at this time  Active Problems    1  Absent pedal pulses (785 9) (R09 89)   2  Atherosclerosis of native artery of both lower extremities with intermittent claudication   (440 21) (I70 213)   3  Benign essential hypertension (401 1) (I10)   4  Carpal tunnel syndrome, right (354 0) (G56 01)   5  Cervical spondylolysis (756 19) (M43 02)   6  Controlled type 2 diabetes mellitus with diabetic neuropathy, without long-term current   use of insulin (250 60,357 2) (E11 40)   7  Coronary artery disease (414 00) (I25 10)   8  Dry cough (786 2) (R05)   9  Dry mouth (527 7) (R68 2)   10  Elevated total protein (790 99) (R77 8)   11  Glucosuria (791 5) (R81)   12  Hyperlipidemia (272 4) (E78 5)   13  Incomplete bladder emptying (788 21) (R33 9)   14   Need for immunization against influenza (V04 81) (Z23)   15  Need for pneumococcal vaccination (V03 82) (Z23)   16  Other constipation (564 09) (K59 09)   17  Peripheral arterial disease (443 9) (I73 9)   18  Posterior neck pain (723 1) (M54 2)   19  Radicular pain (729 2) (M54 10)   20  Seasonal allergies (477 9) (J30 2)   21  Severe carpal tunnel syndrome of right wrist (354 0) (G56 01)   22  Urine troubles (V47 4) (R39 89)    Past Medical History    1  No pertinent past medical history    Surgical History    1  History of CABG    Family History  Mother    1  Family history of diabetes mellitus (V18 0) (Z83 3)   2  Family history of heart disease (V17 49) (Z82 49)  Father    3  Family history of diabetes mellitus (V18 0) (Z83 3)   4  Family history of heart disease (V17 49) (Z82 49)  Brother    5  Family history of hypertension (V17 49) (Z82 49)  Family History    6  Denied: Family history of drug addiction   7  Family history of mental disorder (V17 0) (Z81 8)    Social History    · Inadequate exercise (V69 0) (Z72 3)   · Lives with adult children   · Never a smoker   · No alcohol use   · No illicit drug use   · Non-smoker (V49 89) (Z78 9)   · Welsh speaking patient (V40 1) (Z78 9)    Current Meds    1  Blood Pressure KIT; CHECK BLOOD PRESSURE 1-2 TIMES PER WEEK OR AS   DIRECTED; Therapy: 79HZL0896 to (Last Rx:19Oct2017) Ordered   2  Metoprolol Succinate ER 50 MG Oral Tablet Extended Release 24 Hour; TAKE 1 TABLET   BY MOUTH ONCE A DAY; Therapy: 20NNU9924 to (Evaluate:74Fpp7480)  Requested for: 26YCZ3131; Last   Rx:16Nov2017 Ordered    3  Losartan Potassium 50 MG Oral Tablet; TAKE 1 TABLET DAILY; Therapy: 34Tuw8909 to (Evaluate:25Dcv7590)  Requested for: 88DUN3412; Last   Rx:19Oct2017 Ordered    4  Gabapentin 100 MG Oral Capsule; TAKE 1 CAPSULE EVERY DAY AT BEDTIME FOR 1   WEEK THEN INCREASE TO 2 CAPSULES AT BEDTIME;    Therapy: 24GQA5263 to [phone])  Requested for: 03Ujd2811; Last   Ambar [place] Ordered    5  FreeStyle Lancets Miscellaneous; TEST 1 TIME DAILY; Therapy: 22QBU3135 to (Last Yolie Dawn)  Requested for: 26TCV1197 Ordered   6  FreeStyle Lite Device; USE AS DIRECTED; Therapy: 18MLC6938 to (Last Yolie Dawn)  Requested for: 93Mal2134 Ordered   7  FreeStyle Lite Test In Vitro Strip; TEST ONCE DAILY; Therapy: 03WNA8538 to (Last Yolie Dawn)  Requested for: 65ASH3376 Ordered    8  Atorvastatin Calcium 20 MG Oral Tablet; Take 1 tablet daily; Therapy: 30QRM4721 to (Evaluate:63Vrs0614)  Requested for: 54CFF6769; Last   Rx:16Nov2017 Ordered    9  Aspirin 81 MG Oral Tablet Delayed Release; TAKE 1 TABLET DAILY; Therapy: 57GFS3459 to (Evaluate:66Tfy8206)  Requested for: 02IDE7525; Last   EF:13CLR9462 Ordered    10  Azelastine HCl - 0 1 % Nasal Solution; USE 1 SPRAY IN EACH NOSTRIL TWICE DAILY; Therapy: 57WXL7589 to (Last Rx:19Oct2017)  Requested for: 19Oct2017 Ordered   11  Benzonatate 100 MG Oral Capsule; TAKE 1 CAPSULE EVERY 8 HOURS AS NEEDED; Therapy: 84VMX7850 to (96 505010)  Requested for: 07OEG8280; Last    Rx:19Oct2017 Ordered    12  Carpal Tunnel Wrist Stabilizer Miscellaneous; USE AS DIRECTED EVERY DAY FOR 6-8    WEEKS; Therapy: 08Bwe7369 to (Last Rx:59Lrv8596) Ordered    Allergies    1  No Known Drug Allergies    2  No Known Environmental Allergies   3  No Known Food Allergies    End of Encounter Meds    1  Blood Pressure KIT; CHECK BLOOD PRESSURE 1-2 TIMES PER WEEK OR AS   DIRECTED; Therapy: 44OQU1061 to (Last Rx:19Oct2017) Ordered   2  Metoprolol Succinate ER 50 MG Oral Tablet Extended Release 24 Hour; TAKE 1 TABLET   BY MOUTH ONCE A DAY; Therapy: 99OKM1253 to (Evaluate:18Yvb6714)  Requested for: 17VDH6413; Last   Rx:16Nov2017 Ordered    3  Losartan Potassium 50 MG Oral Tablet; TAKE 1 TABLET DAILY; Therapy: 86Vyj1451 to (Evaluate:71Wly8312)  Requested for: 23RIN3630; Last   Rx:19Oct2017 Ordered    4   Gabapentin 100 MG Oral Capsule; TAKE 1 CAPSULE EVERY DAY AT BEDTIME FOR 1   WEEK THEN INCREASE TO 2 CAPSULES AT BEDTIME; Therapy: 71KSK8443 to (96 550406)  Requested for: 95Bjt9034; Last   Rx:88Lyg7555 Ordered    5  FreeStyle Lancets Miscellaneous; TEST 1 TIME DAILY; Therapy: 67XKM5525 to (Last Deniz Head)  Requested for: 00HOP3552 Ordered   6  FreeStyle Lite Device; USE AS DIRECTED; Therapy: 04JTZ1669 to (Last Deniz Head)  Requested for: 37Vkt5486 Ordered   7  FreeStyle Lite Test In Vitro Strip; TEST ONCE DAILY; Therapy: 14MXX1277 to (Last Deniz Head)  Requested for: 08QJW6738 Ordered    8  Atorvastatin Calcium 20 MG Oral Tablet; Take 1 tablet daily; Therapy: 16XRT6686 to (Evaluate:00Xfu9597)  Requested for: 22AOI7387; Last   Rx:63Tjg9899 Ordered    9  Aspirin 81 MG Oral Tablet Delayed Release; TAKE 1 TABLET DAILY; Therapy: 39OZG1754 to (Evaluate:37Tup4106)  Requested for: 58LEA0724; Last   RN:97WSY6242 Ordered    10  Azelastine HCl - 0 1 % Nasal Solution; USE 1 SPRAY IN EACH NOSTRIL TWICE DAILY; Therapy: 22ZRU8237 to (Last Rx:77Hzo5320)  Requested for: 40Nhr0289 Ordered   11  Benzonatate 100 MG Oral Capsule; TAKE 1 CAPSULE EVERY 8 HOURS AS NEEDED; Therapy: 31KJT2179 to (96 581195)  Requested for: 84IGU4380; Last    Rx:07Ljb5245 Ordered    12  Carpal Tunnel Wrist Stabilizer Miscellaneous; USE AS DIRECTED EVERY DAY FOR 6-8    WEEKS;     Therapy: 58Khc2389 to (Last Rx:95Qvd7556) Ordered    Future Appointments    Date/Time Provider Specialty Site   12/28/2017 02:00 PM Leslie Rebolledo DO Internal Medicine [facility] INTERNAL MED   11/21/2017 02:00 PM Jordan Nichols, Nurse Schedule  [facility] [address]     Patient Care Team    Care Team Member Role Specialty Office Number   Maged Terrell [facility]  Family Medicine [phone]   Jenni Anaya   [phone]   [facility]  Nurse Practitioner [phone]     Signatures   Electronically signed by : Tammy Abbott; Nov 16 2017  3:24PM EST (Author)

## 2018-01-17 NOTE — RESULT NOTES
Verified Results  (1) COMPREHENSIVE METABOLIC PANEL 11ODR4725 51:96HA Luci Burks     Test Name Result Flag Reference   GLUCOSE,RANDM 164 mg/dL H    If the patient is fasting, the ADA then defines impaired fasting glucose as > 100 mg/dL and diabetes as > or equal to 123 mg/dL  SODIUM 139 mmol/L  136-145   POTASSIUM 4 5 mmol/L  3 5-5 3   Slightly Hemolyzed; Results May be Affected   CHLORIDE 103 mmol/L  100-108   CARBON DIOXIDE 28 mmol/L  21-32   ANION GAP (CALC) 8 mmol/L  4-13   BLOOD UREA NITROGEN 9 mg/dL  5-25   CREATININE 0 83 mg/dL  0 60-1 30   Standardized to IDMS reference method   CALCIUM 9 0 mg/dL  8 3-10 1   BILI, TOTAL 1 50 mg/dL H 0 20-1 00   ALK PHOSPHATAS 104 U/L     ALT (SGPT) 60 U/L  12-78   AST(SGOT) 42 U/L  5-45   Slightly Hemolyzed; Results May be Affected   ALBUMIN 3 8 g/dL  3 5-5 0   TOTAL PROTEIN 7 6 g/dL  6 4-8 2   eGFR Non-African American      >60 0 ml/min/1 73sq m   [facility] Disease Education Program recommendations are as follows:  GFR calculation is accurate only with a steady state creatinine  Chronic Kidney disease less than 60 ml/min/1 73 sq  meters  Kidney failure less than 15 ml/min/1 73 sq  meters

## 2018-01-22 VITALS
SYSTOLIC BLOOD PRESSURE: 146 MMHG | DIASTOLIC BLOOD PRESSURE: 80 MMHG | HEIGHT: 70 IN | RESPIRATION RATE: 16 BRPM | TEMPERATURE: 97.9 F | HEART RATE: 56 BPM | WEIGHT: 177.25 LBS | OXYGEN SATURATION: 97 % | BODY MASS INDEX: 25.38 KG/M2

## 2018-01-22 VITALS
HEIGHT: 70 IN | TEMPERATURE: 98 F | SYSTOLIC BLOOD PRESSURE: 118 MMHG | BODY MASS INDEX: 26.48 KG/M2 | WEIGHT: 185 LBS | RESPIRATION RATE: 16 BRPM | DIASTOLIC BLOOD PRESSURE: 80 MMHG

## 2018-01-25 ENCOUNTER — PATIENT OUTREACH (OUTPATIENT)
Dept: INTERNAL MEDICINE CLINIC | Facility: CLINIC | Age: 63
End: 2018-01-25

## 2018-01-25 NOTE — PROGRESS NOTES
#[id number] used  Patient checking blood sugars every two days  States range between 120-150  Denies any complaints of hypo or hyperglycemia  All prescriptions filled  Continuing to go to [facility] 3-5 days weekly  States takes public bus  Inquiring about "bus to his house " Inquired if patient had [address] number for medical assistance to complete [place] application  Provided patient with number for [facility] [place] office [place] line to call if he does not know his [address] number  Encouraged to schedule follow up appt with Dr Rosalva Lowery  Denies any questions or concerns  Provided my contact information and encouraged to call if needed

## 2018-02-02 ENCOUNTER — APPOINTMENT (OUTPATIENT)
Dept: LAB | Facility: CLINIC | Age: 63
End: 2018-02-02
Payer: COMMERCIAL

## 2018-02-02 ENCOUNTER — OFFICE VISIT (OUTPATIENT)
Dept: INTERNAL MEDICINE CLINIC | Facility: CLINIC | Age: 63
End: 2018-02-02
Payer: COMMERCIAL

## 2018-02-02 VITALS
HEIGHT: 66 IN | BODY MASS INDEX: 30.05 KG/M2 | TEMPERATURE: 98 F | HEART RATE: 56 BPM | OXYGEN SATURATION: 98 % | RESPIRATION RATE: 18 BRPM | SYSTOLIC BLOOD PRESSURE: 190 MMHG | DIASTOLIC BLOOD PRESSURE: 90 MMHG | WEIGHT: 187 LBS

## 2018-02-02 DIAGNOSIS — I25.10 CORONARY ARTERY DISEASE INVOLVING NATIVE CORONARY ARTERY OF NATIVE HEART WITHOUT ANGINA PECTORIS: ICD-10-CM

## 2018-02-02 DIAGNOSIS — E11.40 CONTROLLED TYPE 2 DIABETES MELLITUS WITH DIABETIC NEUROPATHY, WITHOUT LONG-TERM CURRENT USE OF INSULIN (HCC): ICD-10-CM

## 2018-02-02 DIAGNOSIS — Z91.11 DIETARY NONCOMPLIANCE: ICD-10-CM

## 2018-02-02 DIAGNOSIS — I10 UNCONTROLLED HYPERTENSION: ICD-10-CM

## 2018-02-02 DIAGNOSIS — I10 UNCONTROLLED HYPERTENSION: Primary | ICD-10-CM

## 2018-02-02 PROBLEM — M54.10 RADICULAR PAIN: Status: ACTIVE | Noted: 2017-05-04

## 2018-02-02 PROBLEM — I70.213 ATHEROSCLEROSIS OF NATIVE ARTERY OF BOTH LOWER EXTREMITIES WITH INTERMITTENT CLAUDICATION (HCC): Status: ACTIVE | Noted: 2017-09-28

## 2018-02-02 PROBLEM — J30.2 SEASONAL ALLERGIES: Status: ACTIVE | Noted: 2017-05-04

## 2018-02-02 PROBLEM — K59.09 OTHER CONSTIPATION: Status: ACTIVE | Noted: 2017-05-10

## 2018-02-02 PROBLEM — G56.01 SEVERE CARPAL TUNNEL SYNDROME OF RIGHT WRIST: Status: ACTIVE | Noted: 2017-09-13

## 2018-02-02 PROBLEM — M43.02 CERVICAL SPONDYLOLYSIS: Status: ACTIVE | Noted: 2017-05-04

## 2018-02-02 PROBLEM — I73.9 PERIPHERAL ARTERIAL DISEASE (HCC): Status: ACTIVE | Noted: 2017-09-25

## 2018-02-02 PROBLEM — R33.9 INCOMPLETE EMPTYING OF BLADDER: Status: ACTIVE | Noted: 2017-05-04

## 2018-02-02 PROBLEM — E78.5 HYPERLIPIDEMIA: Status: ACTIVE | Noted: 2018-02-02

## 2018-02-02 PROBLEM — R77.8 ELEVATED TOTAL PROTEIN: Status: ACTIVE | Noted: 2017-08-24

## 2018-02-02 LAB
ANION GAP SERPL CALCULATED.3IONS-SCNC: 8 MMOL/L (ref 4–13)
BACTERIA UR QL AUTO: NORMAL /HPF
BILIRUB UR QL STRIP: NEGATIVE
BUN SERPL-MCNC: 12 MG/DL (ref 5–25)
CALCIUM SERPL-MCNC: 9.1 MG/DL (ref 8.3–10.1)
CHLORIDE SERPL-SCNC: 103 MMOL/L (ref 100–108)
CLARITY UR: CLEAR
CO2 SERPL-SCNC: 27 MMOL/L (ref 21–32)
COLOR UR: YELLOW
CREAT SERPL-MCNC: 0.95 MG/DL (ref 0.6–1.3)
GFR SERPL CREATININE-BSD FRML MDRD: 85 ML/MIN/1.73SQ M
GLUCOSE SERPL-MCNC: 105 MG/DL (ref 65–140)
GLUCOSE UR STRIP-MCNC: NEGATIVE MG/DL
HGB UR QL STRIP.AUTO: ABNORMAL
KETONES UR STRIP-MCNC: NEGATIVE MG/DL
LEUKOCYTE ESTERASE UR QL STRIP: NEGATIVE
NITRITE UR QL STRIP: NEGATIVE
NON-SQ EPI CELLS URNS QL MICRO: NORMAL /HPF
PH UR STRIP.AUTO: 6 [PH] (ref 4.5–8)
POTASSIUM SERPL-SCNC: 3.9 MMOL/L (ref 3.5–5.3)
PROT UR STRIP-MCNC: NEGATIVE MG/DL
RBC #/AREA URNS AUTO: NORMAL /HPF
SODIUM SERPL-SCNC: 138 MMOL/L (ref 136–145)
SP GR UR STRIP.AUTO: <=1.005 (ref 1–1.03)
UROBILINOGEN UR QL STRIP.AUTO: 0.2 E.U./DL
WBC #/AREA URNS AUTO: NORMAL /HPF

## 2018-02-02 PROCEDURE — 36415 COLL VENOUS BLD VENIPUNCTURE: CPT

## 2018-02-02 PROCEDURE — 80048 BASIC METABOLIC PNL TOTAL CA: CPT

## 2018-02-02 PROCEDURE — 99214 OFFICE O/P EST MOD 30 MIN: CPT | Performed by: INTERNAL MEDICINE

## 2018-02-02 PROCEDURE — 81001 URINALYSIS AUTO W/SCOPE: CPT

## 2018-02-02 RX ORDER — LANCETS 28 GAUGE
EACH MISCELLANEOUS DAILY
COMMUNITY
Start: 2017-09-25

## 2018-02-02 RX ORDER — BLOOD PRESSURE TEST KIT
KIT MISCELLANEOUS
COMMUNITY
Start: 2017-10-19

## 2018-02-02 RX ORDER — LOSARTAN POTASSIUM 50 MG/1
1 TABLET ORAL DAILY
COMMUNITY
Start: 2017-08-20 | End: 2018-02-06 | Stop reason: SDUPTHER

## 2018-02-02 RX ORDER — GABAPENTIN 100 MG/1
CAPSULE ORAL
COMMUNITY
Start: 2017-05-04 | End: 2018-02-27 | Stop reason: SDUPTHER

## 2018-02-02 RX ORDER — METOPROLOL SUCCINATE 50 MG/1
1 TABLET, EXTENDED RELEASE ORAL DAILY
COMMUNITY
Start: 2017-05-10 | End: 2018-02-20 | Stop reason: SDUPTHER

## 2018-02-02 RX ORDER — BLOOD-GLUCOSE METER
KIT MISCELLANEOUS
COMMUNITY
Start: 2017-09-25

## 2018-02-02 RX ORDER — ATORVASTATIN CALCIUM 20 MG/1
1 TABLET, FILM COATED ORAL DAILY
COMMUNITY
Start: 2017-11-16 | End: 2018-02-20 | Stop reason: SDUPTHER

## 2018-02-02 NOTE — PATIENT INSTRUCTIONS
1  Return for blood pressure re-check on Tuesday  2  Low salt diet  3  Medications refilled      Hipertensión crónica   LO QUE NECESITA SABER:   La hipertensión es la presión arterial destinee  La presión arterial es la fuerza que ejerce la gerald contra las lafleur de las arterias  La presión arterial normal debería estar a menos de 120/80  La pre-hipertensión estaría entre 120/80 y 139/ 80  La presión arterial destinee estaría a 140/90 o más destinee  La hipertensión causa que rome presión arterial se eleve tanto que rome corazón se ve forzado a trabajar [employer] de lo normal  Zurich puede dañar rome corazón  La hipertensión crónica es sanjana condición de dot plazo que usted puede controlar con un estilo de jacob robert o con medicamentos  La presión [place] a proteger ivet órganos vashti rome corazón, pulmones, cerebro, y riñones  INSTRUCCIONES SOBRE [facility]:   Llame al 911 en dave de presentar lo siguiente:   · Usted tiene malestar en el pecho que se siente vashti estrujamiento, presión, [place] o dolor  · Usted se siente confundido o tiene dificultad para hablar  · Repentinamente se siente aturdido o con dificultad para respirar  · Usted tiene dolor o [employer] espalda, [place], [place], abdomen o [place]  Regrese a la annie de emergencias si:   · Usted tiene un gabriel dolor de joseph o pérdida de la visión  · Usted tiene debilidad en un brazo o en sanjana pierna  Pregúntele a rome [place] vitaminas y minerales son adecuados para usted  · Usted se siente mareado, confundido, somnoliento o vashti si se fuera a desmayar  · Usted se ha tomado rome medicamento para la presión arterial forrest rome presión arterial todavía está más destinee de lo que le indicó rome médico     · Usted tiene preguntas o inquietudes acerca de rome condición o cuidado    Medicamentos:  Es posible que usted necesite alguno de los siguientes:  · Medicamento  podría usarse para ayudar a disminuir la presión arterial  Es posible que necesite más de un tipo de medicamento  [place] el medicamento exactamente vashti indicado  · Diuréticos  ayudan a eliminar el exceso de líquido que se acumula en el organismo  [place] contribuirá a bajar rome presión arterial  Es posible que orine más seguido mientras lashay maryjo medicamento  · Los medicamentos para el colesterol  ayudan a bajar los niveles de [place]  Un nivel bajo de colesterol ayuda a prevenir enfermedades cardíacas y facilita el control de la presión arterial      · [place] ivet medicamentos vashti se le haya indicado  Consulte con rome médico si usted nicholas que rome medicamento no le está ayudando o si presenta efectos secundarios  Infórmele si es alérgico a cualquier medicamento  Mantenga sanjana lista actualizada de los [employer], las vitaminas y los productos herbales que lashay  Incluya los siguientes datos de los medicamentos: cantidad, frecuencia y motivo de administración  Traiga con usted la lista o los envases de la píldoras a ivet citas de seguimiento  Lleve la lista de los medicamentos con usted en dave de sanjana emergencia  Acuda a ivet consultas de control con rome médico según le indicaron  Usted necesitará regresar para medir rome presión arterial y realizar otros exámenes de laboratorio  Anote ivet preguntas para que se acuerde de hacerlas lacey ivet visitas  Controle la hipertensión crónica:  Hable con rome médico sobre las siguientes recomendaciones y otras formas de controlar la hipertensión:  · Tómese la presión arterial en rome casa  Siéntese y descanse por 5 minutos antes de tomarse la presión arterial  Extienda rome brazo y apóyelo en sanjana superficie plana  [place] brazo debe estar a la misma altura que rome corazón  Siga las instrucciones que vienen con el monitor para la presión arterial o tensiómetro  Si es posible tome por lo menos 2 lecturas de la presión cada vez  Tómese la presión arterial por lo [employer] al día a la misma hora todos los días, sanjana en la mañana y la otra en la noche  Mantenga un registro de las lecturas de rome presión arterial y llévelo consigo a ivet consultas  Pregúntele a rome médico cuál debería ser rome presión arterial            · Limite el sodio (la sal) vashti se le haya indicado  Demasiado sodio puede afectar el equilibrio de líquidos  Revise las etiquetas para buscar alimentos bajos en sodio o sin sal agregada  Algunos alimentos bajos en sodio utilizan sales de potasio para añadir sabor  Demasiado potasio también puede causar problemas de [place]  [place] médico le dirá qué cantidad de sodio y potasio es armijo para el consumo en un día  Él puede recomendarle que limite el sodio a 2,300 mg al día  · Siga el plan de comidas recomendado por rome médico   Un dietista o médico puede darle más información sobre planes de bajo contenido de sodio o el plan de alimentación DASH (enfoques dietéticos para detener la hipertensión)  El plan DASH es bajo en sodio, grasas saturadas y grasa total  Es alto en potasio, calcio y [place]  · Ejercítese para mantener un peso saludable  Realice actividad física por lo menos 30 minutos al día, la mayoría de los días de la [place]  [place] ayudará a bajar rome presión arterial  Pida más información acerca de un plan de ejercicio adecuado para usted  · [address] [place] [address] estrés  [place] podría ayudarlo a bajar rome presión arterial  Aprenda sobre formas de relajarse, vashti respiración profunda o escuchar música  · Limite el consumo de alcohol  Las mujeres deberían limitar el consumo de alcohol a 1 bebida por día  Los hombres deberían limitar el consumo de alcohol a 2 tragos al día  Un trago equivale a 12 onzas de cerveza, 5 onzas de vino o 1 onza y ½ de licor  · No fume  La nicotina y otros químicos en los cigarrillos y cigarros pueden aumentar rome presión arterial y también pueden provocar daño al pulmón  Pida información a rome médico si usted actualmente fuma y necesita ayuda para dejar de fumar   Los cigarrillos electrónicos o tabaco sin humo todavía contienen nicotina  Consulte con esteban médico antes de [employer]  © 2017 [address] Information is for End User's use only and may not be sold, redistributed or otherwise used for commercial purposes  All illustrations and images included in CareNotes® are the copyrighted property of A D A M , Inc  or Archie Fowler  Esta información es sólo para uso en educación  Esteban intención no es darle un consejo médico sobre enfermedades o tratamientos  Colsulte con esteban Sam Loredo farmacéutico antes de seguir cualquier régimen médico para saber si es seguro y efectivo para usted

## 2018-02-03 NOTE — PROGRESS NOTES
Assessment/Plan:     Diagnoses and all orders for this visit:    Uncontrolled hypertension  Comments:  taking BP meds as prescribed but poor dietary compliance  advised to add bp medication today, pt declined and requests to modify diet and re-check BP next week  Orders:  -     Basic metabolic panel; Future  -     Cancel: UA/M w/rflx Culture, Routine    Controlled type 2 diabetes mellitus with diabetic neuropathy, without long-term current use of insulin (HCC)  Comments:  has glucometer at home but not checking BS   last A1C <6%  re-check A1C today and advised to check BS at home  Orders:  -     aspirin 81 MG tablet; Take 1 tablet (81 mg total) by mouth daily  -     Hemoglobin A1c; Future    Coronary artery disease involving native coronary artery of native heart without angina pectoris  Comments:  taking asa, statin, BB, ARB -> denjamilahs CP/SOB/SAN  emphasized dietary/Na compliance  Orders:  -     aspirin 81 MG tablet; Take 1 tablet (81 mg total) by mouth daily  -     Basic metabolic panel; Future  -     Cancel: UA/M w/rflx Culture, Routine    Dietary noncompliance  Comments:  d/w pt in detail(see HPI & Plan)    Other orders  -     Discontinue: aspirin 81 MG tablet; Take 1 tablet by mouth daily  -     atorvastatin (LIPITOR) 20 mg tablet; Take 1 tablet by mouth daily  -     Blood Pressure KIT; by Does not apply route  -     Blood Glucose Monitoring Suppl (FREESTYLE FREEDOM LITE) w/Device KIT; by Does not apply route  -     gabapentin (NEURONTIN) 100 mg capsule; Take by mouth  -     losartan (COZAAR) 50 mg tablet; Take 1 tablet by mouth daily  -     metoprolol succinate (TOPROL-XL) 50 mg 24 hr tablet; Take 1 tablet by mouth daily  -     Lancets (FREESTYLE) lancets; by Does not apply route daily        precautions advised    Subjective:      Patient ID: Nancy Gutierrez is a 60 y o  male  HPI     Here for follow up  Leroy Lui MA present to translate for patient    Reviewed meds with patient/taking as prescribed and no SE  Eating very salty foods lately and checked his BP couple of weeks ago at adult day program[facility]center and was quite high at 777 systolic  He was told to go to ER at that time but declined  Lidia Francis denies SOB/CP/SAN/PND/leg swelling/headache/vision changes or any cardiac complaints  We discussed starting another BP medication today but he declined and prefers to modify his diet and re-check BP next week    Denny did not drive to today's appt & agrees to complete BW today  Social History     Social History    Marital status: Single     Spouse name: N/A    Number of children: N/A    Years of education: N/A     Occupational History    Not on file       Social History Main Topics    Smoking status: Never Smoker    Smokeless tobacco: Never Used    Alcohol use No    Drug use: No    Sexual activity: Not on file     Other Topics Concern    Not on file     Social History Narrative    Drinks coffee      Past Medical History:   Diagnosis Date    Hyperlipidemia     Hypertension      Vitals:    02/02/18 1332 02/02/18 1350 02/02/18 1355   BP: (!) 194/108 (!) 190/92 (!) 190/90   BP Location:  Right arm Left arm   Patient Position:  Sitting Sitting   Cuff Size:  Standard Standard   Pulse: 56     Resp: 18     Temp: 98 °F (36 7 °C)     SpO2: 98%     Weight: 84 8 kg (187 lb)     Height: 5' 6" (1 676 m)         Current Outpatient Prescriptions:     aspirin 81 MG tablet, Take 1 tablet (81 mg total) by mouth daily, Disp: 90 tablet, Rfl: 1    atorvastatin (LIPITOR) 20 mg tablet, Take 1 tablet by mouth daily, Disp: , Rfl:     Blood Glucose Monitoring Suppl (FREESTYLE FREEDOM LITE) w/Device KIT, by Does not apply route, Disp: , Rfl:     Blood Pressure KIT, by Does not apply route, Disp: , Rfl:     gabapentin (NEURONTIN) 100 mg capsule, Take by mouth, Disp: , Rfl:     Lancets (FREESTYLE) lancets, by Does not apply route daily, Disp: , Rfl:     losartan (COZAAR) 50 mg tablet, Take 1 tablet by mouth daily, Disp: , Rfl:     metoprolol succinate (TOPROL-XL) 50 mg 24 hr tablet, Take 1 tablet by mouth daily, Disp: , Rfl:     naproxen (NAPROSYN) 500 mg tablet, Take 1 tablet by mouth 2 (two) times a day with meals for 30 days, Disp: 20 tablet, Rfl: 0  No Known Allergies    Review of Systems   Constitutional: Negative for chills and fatigue  HENT: Negative for sore throat  Eyes: Negative for visual disturbance  Respiratory: Negative for chest tightness and shortness of breath  Cardiovascular: Negative for chest pain and leg swelling  Gastrointestinal: Negative for abdominal pain  Genitourinary: Negative for dysuria  Musculoskeletal: Negative for myalgias  Skin: Negative for rash  Neurological: Negative for dizziness and headaches  Psychiatric/Behavioral: Negative for confusion and decreased concentration  Objective:     Physical Exam   Constitutional: He is oriented to person, place, and time  He appears well-developed and well-nourished  HENT:   Head: Normocephalic and atraumatic  Eyes: Conjunctivae are normal  Pupils are equal, round, and reactive to light  Cardiovascular: Normal rate, regular rhythm and normal heart sounds  No murmur heard  Pulmonary/Chest: Effort normal and breath sounds normal  He has no wheezes  He has no rales  Abdominal: Soft  Bowel sounds are normal  There is no tenderness  Musculoskeletal: He exhibits no edema  Neurological: He is alert and oriented to person, place, and time  Psychiatric: He has a normal mood and affect  His behavior is normal    Vitals reviewed

## 2018-02-05 ENCOUNTER — TELEPHONE (OUTPATIENT)
Dept: INTERNAL MEDICINE CLINIC | Facility: CLINIC | Age: 63
End: 2018-02-05

## 2018-02-05 NOTE — TELEPHONE ENCOUNTER
----- Message from Jeanne Cowden, DO sent at 2/2/2018  4:56 PM EST -----  (pt speaks Canadian) Lab results from Friday look fine, continue with current treatment plan

## 2018-02-06 ENCOUNTER — CLINICAL SUPPORT (OUTPATIENT)
Dept: INTERNAL MEDICINE CLINIC | Facility: CLINIC | Age: 63
End: 2018-02-06

## 2018-02-06 ENCOUNTER — APPOINTMENT (OUTPATIENT)
Dept: LAB | Facility: CLINIC | Age: 63
End: 2018-02-06
Payer: COMMERCIAL

## 2018-02-06 VITALS — DIASTOLIC BLOOD PRESSURE: 98 MMHG | SYSTOLIC BLOOD PRESSURE: 160 MMHG | HEART RATE: 90 BPM

## 2018-02-06 DIAGNOSIS — R73.03 PREDIABETES: ICD-10-CM

## 2018-02-06 DIAGNOSIS — I10 HYPERTENSION, UNSPECIFIED TYPE: Primary | ICD-10-CM

## 2018-02-06 LAB
EST. AVERAGE GLUCOSE BLD GHB EST-MCNC: 111 MG/DL
HBA1C MFR BLD: 5.5 % (ref 4.2–6.3)

## 2018-02-06 PROCEDURE — RECHECK: Performed by: INTERNAL MEDICINE

## 2018-02-06 PROCEDURE — 83036 HEMOGLOBIN GLYCOSYLATED A1C: CPT

## 2018-02-06 PROCEDURE — 36415 COLL VENOUS BLD VENIPUNCTURE: CPT

## 2018-02-06 RX ORDER — LOSARTAN POTASSIUM 50 MG/1
100 TABLET ORAL DAILY
Refills: 0 | COMMUNITY
Start: 2018-02-06 | End: 2018-02-06 | Stop reason: SDUPTHER

## 2018-02-06 RX ORDER — LOSARTAN POTASSIUM 100 MG/1
100 TABLET ORAL DAILY
Qty: 90 TABLET | Refills: 1 | Status: SHIPPED | OUTPATIENT
Start: 2018-02-06 | End: 2018-05-29 | Stop reason: SDUPTHER

## 2018-02-06 RX ORDER — LOSARTAN POTASSIUM 100 MG/1
100 TABLET ORAL DAILY
Qty: 90 TABLET | Refills: 1 | Status: SHIPPED | OUTPATIENT
Start: 2018-02-06 | End: 2018-02-06

## 2018-02-06 NOTE — PROGRESS NOTES
Here for BP check    Doing well, denies symptoms or complaints    Had blood drawn just prior to appt and had some left arm pain from venous stick    Re-checked BP on right arm and was better    Decreased his salt intake since last week    No other complaints    Plan - increase losartan to 100mg per day         F/u BP check next week    Results for orders placed or performed in visit on 00/67/92   Basic metabolic panel   Result Value Ref Range    Sodium 138 136 - 145 mmol/L    Potassium 3 9 3 5 - 5 3 mmol/L    Chloride 103 100 - 108 mmol/L    CO2 27 21 - 32 mmol/L    Anion Gap 8 4 - 13 mmol/L    BUN 12 5 - 25 mg/dL    Creatinine 0 95 0 60 - 1 30 mg/dL    Glucose 105 65 - 140 mg/dL    Calcium 9 1 8 3 - 10 1 mg/dL    eGFR 85 ml/min/1 73sq m   UA w Reflex to Microscopic w Reflex to Culture   Result Value Ref Range    Color, UA Yellow     Clarity, UA Clear     Specific Gravity, UA <=1 005 1 003 - 1 030    pH, UA 6 0 4 5 - 8 0    Leukocytes, UA Negative Negative    Nitrite, UA Negative Negative    Protein, UA Negative Negative mg/dl    Glucose, UA Negative Negative mg/dl    Ketones, UA Negative Negative mg/dl    Urobilinogen, UA 0 2 0 2, 1 0 E U /dl E U /dl    Bilirubin, UA Negative Negative    Blood, UA Trace-Intact (A) Negative   Urine Microscopic   Result Value Ref Range    RBC, UA None Seen None Seen, 0-5 /hpf    WBC, UA None Seen None Seen, 0-5, 5-55, 5-65 /hpf    Epithelial Cells None Seen None Seen, Occasional /hpf    Bacteria, UA Occasional None Seen, Occasional /hpf

## 2018-02-07 ENCOUNTER — TELEPHONE (OUTPATIENT)
Dept: INTERNAL MEDICINE CLINIC | Facility: CLINIC | Age: 63
End: 2018-02-07

## 2018-02-07 NOTE — TELEPHONE ENCOUNTER
----- Message from Holly Bueno DO sent at 2/6/2018  5:56 PM EST -----  Diabetes BW Is back and blood sugars are doing well with A1C of 5 5%, pls continue with diet controlled diabetes care, thx

## 2018-02-20 ENCOUNTER — CLINICAL SUPPORT (OUTPATIENT)
Dept: INTERNAL MEDICINE CLINIC | Facility: CLINIC | Age: 63
End: 2018-02-20

## 2018-02-20 VITALS — DIASTOLIC BLOOD PRESSURE: 94 MMHG | SYSTOLIC BLOOD PRESSURE: 154 MMHG | HEART RATE: 88 BPM

## 2018-02-20 DIAGNOSIS — E78.2 MIXED HYPERLIPIDEMIA: ICD-10-CM

## 2018-02-20 DIAGNOSIS — I10 ESSENTIAL HYPERTENSION: Primary | ICD-10-CM

## 2018-02-20 PROCEDURE — RECHECK: Performed by: INTERNAL MEDICINE

## 2018-02-20 RX ORDER — AMLODIPINE BESYLATE 5 MG/1
5 TABLET ORAL DAILY
Qty: 30 TABLET | Refills: 1 | Status: SHIPPED | OUTPATIENT
Start: 2018-02-20 | End: 2018-02-27 | Stop reason: SDUPTHER

## 2018-02-20 RX ORDER — ATORVASTATIN CALCIUM 20 MG/1
20 TABLET, FILM COATED ORAL DAILY
Qty: 90 TABLET | Refills: 1 | OUTPATIENT
Start: 2018-02-20 | End: 2018-05-29 | Stop reason: SDUPTHER

## 2018-02-20 RX ORDER — METOPROLOL SUCCINATE 50 MG/1
50 TABLET, EXTENDED RELEASE ORAL DAILY
Qty: 90 TABLET | Refills: 1 | OUTPATIENT
Start: 2018-02-20 | End: 2018-05-29 | Stop reason: SDUPTHER

## 2018-02-20 NOTE — PATIENT INSTRUCTIONS
1  Add amlodpine once a day  2  Return for blood pressure re-check next week  3  BRING ALL YOUR MEDICATION BOTTLES TO APPOINTMENT NEXT WEEK  Amlodipino (Por la boca)   Se [place] para tratar la presión arterial carolee y la angina (dolor en el pecho)  Yue medicamento es un agente bloqueador del canal de calcio  Marisol(s) : Norvasc   Existen muchas otras marcas de yue medicamento  Yue medicamento no debe ser usado cuando:   Yue medicamento no es adecuado para todas las personas  No use yue medicamento si alguna vez ha tenido sanjana reacción alérgica a la amlodipina  Forma de usar yue medicamento:   Darcia Carbine para disolver  · [place] ivet medicamentos vashti se le haya indicado  Es probable que sea necesario cambiar rome dosis varias veces hasta encontrar la que funciona mejor para usted  [employer] a la misma hora todos los kd  · [place] y siga las instrucciones para el paciente que vienen con el medicamento  Hable con rome médico o farmacéutico si tiene alguna pregunta  · Si olvida sanjana dosis: [place] la dosis tan pronto vashti lo recuerde  Si prajapati pasado más de 12 horas de la dosis que se supone que usted tome, omita la dosis [place] y tome la próxima dosis a la hora regular  · Guarde el medicamento en un recipiente cerrado a temperatura ambiente y alejado del calor, la humedad y la roger directa  Medicamentos y [employer] que debe evitar:   Consulte con rome médico o farmacéutico antes de usar cualquier medicamento, incluyendo los que compra sin receta médica, las vitaminas y los productos herbales  · Algunos medicamentos pueden afectar la eficacia con que actúa la amlodipina   Informe a u médico si usted [employer] alguno de los siguientes medicamentos:   ¨ Claritromicina, ciclosporina, diltiazem, itraconazol, ritonavir, sildenafil, simvastatina, tacrolimús  Precauciones lacey el uso de yue medicamento:   · Informe a rome médico si usted está embarazada o dando de lactar, o si padece de sanjana enfermedad del hígado, enfermedad del corazón, arterioesclerosis coronaria, o estenosis aórtica  · Yue medicamento puede bajarle demasiado rome presión arterial, especialmente cuando lo use por primera vez o si usted sufre sanjana deshidratación  Si se siente desvanecer o mareado póngase de pie o siéntese lentamente  · [place] médico tendrá que revisar rome progreso y los efectos de yue medicamento lacey ivet citas regulares  [place]umpla sin falta con todas ivet citas médicas  Asista a todas ivet citas  · Aunque se sienta mejor, no suspenda el uso de yue medicamento sin antes consultar con rome médico  Yue medicamento no le curará la presión arterial, forrest sí le ayudará a mantenerla dentro de un rango normal  Es probable que necesite zev medicamento para la presión arterial por el candelario de rome jacob  · Guarde todos los medicamentos fuera del alcance de los niños  Nunca comparta ivet medicamentos con [facility]  Efectos secundarios que pueden presentarse lacey el uso de yue medicamento:   Consulte inmediatamente con el médico si nota cualquiera de estos efectos secundarios:  · Reacción alérgica: Comezón o ronchas, hinchazón del rosaline o las ramona, hinchazón u hormigueo en la boca o garganta, opresión en el pecho, dificultad para respirar  · Desvanecimientos, mareos  · Dolor en el pecho nuevo o que empeora  · Inflamación de ivet ramona, tobillos o pies  · Dificultad para respirar, náuseas, sudoración inusual, desmayos  Consulte con el médico si nota otros efectos secundarios que nicholas son causados por yue medicamento  Llame a rome médico para consultarle Sydnie  Usted puede notificar ivet efectos secundarios al FDA al [id number]  © 2017 [address] [place] [address] Information is for End User's use only and may not be sold, redistributed or otherwise used for commercial purposes  Esta información es sólo para uso en educación  [place] intención no es darle un consejo médico sobre enfermedades o tratamientos   Colsulte con rome Elizabeth Seals farmacéutico antes de seguir cualquier régimen médico para saber si es seguro y efectivo para usted

## 2018-02-27 ENCOUNTER — CLINICAL SUPPORT (OUTPATIENT)
Dept: INTERNAL MEDICINE CLINIC | Facility: CLINIC | Age: 63
End: 2018-02-27

## 2018-02-27 VITALS — SYSTOLIC BLOOD PRESSURE: 152 MMHG | HEART RATE: 84 BPM | DIASTOLIC BLOOD PRESSURE: 90 MMHG

## 2018-02-27 DIAGNOSIS — I10 BENIGN ESSENTIAL HYPERTENSION: ICD-10-CM

## 2018-02-27 DIAGNOSIS — I10 ESSENTIAL HYPERTENSION: ICD-10-CM

## 2018-02-27 DIAGNOSIS — I25.10 CORONARY ARTERY DISEASE INVOLVING NATIVE CORONARY ARTERY OF NATIVE HEART WITHOUT ANGINA PECTORIS: ICD-10-CM

## 2018-02-27 DIAGNOSIS — E11.40 CONTROLLED TYPE 2 DIABETES MELLITUS WITH DIABETIC NEUROPATHY, WITHOUT LONG-TERM CURRENT USE OF INSULIN (HCC): Primary | ICD-10-CM

## 2018-02-27 DIAGNOSIS — M43.02 CERVICAL SPONDYLOLYSIS: ICD-10-CM

## 2018-02-27 PROCEDURE — RECHECK

## 2018-02-27 RX ORDER — AMLODIPINE BESYLATE 10 MG/1
10 TABLET ORAL DAILY
Qty: 30 TABLET | Refills: 2 | Status: SHIPPED | OUTPATIENT
Start: 2018-02-27 | End: 2018-06-04 | Stop reason: SDUPTHER

## 2018-02-27 RX ORDER — GABAPENTIN 100 MG/1
100 CAPSULE ORAL 2 TIMES DAILY
Qty: 60 CAPSULE | Refills: 2 | Status: SHIPPED | OUTPATIENT
Start: 2018-02-27 | End: 2018-06-04

## 2018-02-27 NOTE — PROGRESS NOTES
Pt here for BP check    Reports taking meds as prescribed    Did not bring bottles with him to appt    Speaks Syriac, Tyrone Valiente MA here to translate    Plan - BP on re-check by me 152/90          Increase amlodipine to 10mg per day          Cardiology appt(hx of CAD/CABG)         Bring ALL home med bottles to office              Return in 3 weeks for bp check    Results for orders placed or performed in visit on 02/06/18   Hemoglobin A1c   Result Value Ref Range    Hemoglobin A1C 5 5 4 2 - 6 3 %     mg/dl

## 2018-02-27 NOTE — PATIENT INSTRUCTIONS
1  Increase amlodipine to 10mg once a day  2  BRING ALL HOME MEDICATION BOTTLES TO OFFICE  3  CARDIOLOGY APPOINTMENT  4  IF SYMPTOMS DEVELOP, GO TO ER    Hipertensión crónica   LO QUE NECESITA SABER:   La hipertensión es la presión arterial destinee  La presión arterial es la fuerza que ejerce la gerald contra las lafleur de las arterias  La presión arterial normal debería estar a menos de 120/80  La pre-hipertensión estaría entre 120/80 y 139/ 80  La presión arterial destinee estaría a 140/90 o más destinee  La hipertensión causa que rome presión arterial se eleve tanto que rome corazón se ve forzado a trabajar [employer] de lo normal  [place] puede dañar rome corazón  La hipertensión crónica es sanjana condición de dot plazo que usted puede controlar con un estilo de jacob robert o con medicamentos  La presión [place] a proteger ivet órganos vashti rome corazón, pulmones, cerebro, y riñones  INSTRUCCIONES SOBRE [facility]:   Llame al 911 en dave de presentar lo siguiente:   · Usted tiene malestar en el pecho que se siente vashti estrujamiento, presión, [place] o dolor  · Usted se siente confundido o tiene dificultad para hablar  · Repentinamente se siente aturdido o con dificultad para respirar  · Usted tiene dolor o [employer] espalda, [place], [place], abdomen o [place]oz [place]  Regrese a la annie de emergencias si:   · Usted tiene un gabriel dolor de joseph o pérdida de la visión  · Usted tiene debilidad en un brazo o en sanjana pierna  Pregúntele a rome [place] vitaminas y minerales son adecuados para usted  · Usted se siente mareado, confundido, somnoliento o vashti si se fuera a desmayar  · Usted se ha tomado rome medicamento para la presión arterial forrest rome presión arterial todavía está más destinee de lo que le indicó rome médico     · Usted tiene preguntas o inquietudes acerca de rome condición o cuidado    Medicamentos:  Es posible que usted necesite alguno de los siguientes:  · Medicamento  podría usarse para ayudar a disminuir la presión arterial  Es posible que necesite más de un tipo de [place]  [place] el medicamento exactamente vashti indicado  · Diuréticos  ayudan a eliminar el exceso de líquido que se acumula en el organismo  [place] contribuirá a bajar rome presión arterial  Es posible que orine más seguido mientras lashay maryjo medicamento  · Los medicamentos para el colesterol  ayudan a bajar los niveles de [place]  Un nivel bajo de colesterol ayuda a prevenir enfermedades cardíacas y facilita el control de la presión arterial      · [place] ivet medicamentos vashti se le haya indicado  Consulte con rome médico si usted nicholas que rome medicamento no le está ayudando o si presenta efectos secundarios  Infórmele si es alérgico a cualquier medicamento  Mantenga sanjana lista actualizada de los [place], las vitaminas y los productos herbales que lashay  Incluya los siguientes datos de los medicamentos: cantidad, frecuencia y motivo de administración  Traiga con usted la lista o los envases de la píldoras a ivet citas de seguimiento  Lleve la lista de los medicamentos con usted en dave de sanjana emergencia  Acuda a ivet consultas de control con rome médico según le indicaron  Usted necesitará regresar para medir rome presión arterial y realizar otros exámenes de laboratorio  Anote ivet preguntas para que se acuerde de hacerlas lacey ivet visitas  Controle la hipertensión crónica:  Hable con rome médico sobre las siguientes recomendaciones y otras formas de controlar la hipertensión:  · [place]ese la presión arterial en rome casa  Siéntese y descanse por 5 minutos antes de tomarse la presión arterial  Extienda rome brazo y apóyelo en sanjana superficie plana  [place] brazo debe estar a la misma altura que rome corazón  Siga las instrucciones que vienen con el monitor para la presión arterial o tensiómetro  Si es posible tome por lo menos 2 lecturas de la presión cada vez   T[place]ese la presión arterial por lo [place] al día a la misma hora todos los kd, sanjana en la mañana y la otra en la noche  Mantenga un registro de las lecturas de rome presión arterial y llévelo consigo a ivet consultas  Pregúntele a rome médico cuál debería ser rome presión arterial            · Limite el sodio (la sal) vashti se le haya indicado  Demasiado sodio puede afectar el equilibrio de líquidos  Revise las etiquetas para buscar alimentos bajos en sodio o sin sal agregada  Algunos alimentos bajos en sodio utilizan sales de potasio para añadir sabor  Demasiado potasio también puede causar problemas de [place]  [place] médico le dirá qué cantidad de sodio y potasio es armijo para el consumo en un día  Él puede recomendarle que limite el sodio a 2,300 mg al día  · Siga el plan de comidas recomendado por rome médico   Un dietista o médico puede darle más información sobre planes de bajo contenido de sodio o el plan de alimentación DASH (enfoques dietéticos para detener la hipertensión)  El plan DASH es bajo en sodio, grasas saturadas y grasa total  Es alto en potasio, calcio y [place]  · Ejercítese para mantener un peso saludable  Realice actividad física por lo menos 30 minutos al día, la mayoría de los días de la [place]  [place] ayudará a bajar rome presión arterial  Pida más información acerca de un plan de ejercicio adecuado para usted  · [address] [place] [address] estrés  [place] podría ayudarlo a bajar rome presión arterial  Aprenda sobre formas de relajarse, vashti respiración profunda o escuchar música  · Limite el consumo de alcohol  Las mujeres deberían limitar el consumo de alcohol a 1 bebida por día  Los hombres deberían limitar el consumo de alcohol a 2 tragos al día  Un trago equivale a 12 onzas de cerveza, 5 onzas de vino o 1 onza y ½ de licor  · No fume  La nicotina y otros químicos en los cigarrillos y cigarros pueden aumentar rome presión arterial y también pueden provocar daño al pulmón   Pida información a rome médico si usted actualmente fuma y necesita ayuda para dejar de fumar  Los cigarrillos electrónicos o tabaco sin humo todavía contienen nicotina  Consulte con esteban médico antes de [employer]  © 2017 [address] [place] [employer] Information is for End User's use only and may not be sold, redistributed or otherwise used for commercial purposes  All illustrations and images included in CareNotes® are the copyrighted property of A D A M , Inc  or Archie Fowler  Esta información es sólo para uso en educación  Esteban intención no es darle un consejo médico sobre enfermedades o tratamientos  Colsulte con esteban Tomas Seth farmacéutico antes de seguir cualquier régimen médico para saber si es seguro y efectivo para usted

## 2018-04-03 ENCOUNTER — PATIENT OUTREACH (OUTPATIENT)
Dept: INTERNAL MEDICINE CLINIC | Facility: CLINIC | Age: 63
End: 2018-04-03

## 2018-04-12 ENCOUNTER — PATIENT OUTREACH (OUTPATIENT)
Dept: INTERNAL MEDICINE CLINIC | Facility: CLINIC | Age: 63
End: 2018-04-12

## 2018-05-03 ENCOUNTER — PATIENT OUTREACH (OUTPATIENT)
Dept: INTERNAL MEDICINE CLINIC | Facility: CLINIC | Age: 63
End: 2018-05-03

## 2018-05-29 DIAGNOSIS — I10 ESSENTIAL HYPERTENSION: ICD-10-CM

## 2018-05-29 DIAGNOSIS — E11.40 CONTROLLED TYPE 2 DIABETES MELLITUS WITH DIABETIC NEUROPATHY, WITHOUT LONG-TERM CURRENT USE OF INSULIN (HCC): ICD-10-CM

## 2018-05-29 DIAGNOSIS — I10 HYPERTENSION, UNSPECIFIED TYPE: ICD-10-CM

## 2018-05-29 DIAGNOSIS — I25.10 CORONARY ARTERY DISEASE INVOLVING NATIVE CORONARY ARTERY OF NATIVE HEART WITHOUT ANGINA PECTORIS: ICD-10-CM

## 2018-05-29 DIAGNOSIS — E78.2 MIXED HYPERLIPIDEMIA: ICD-10-CM

## 2018-05-29 RX ORDER — METOPROLOL SUCCINATE 50 MG/1
50 TABLET, EXTENDED RELEASE ORAL DAILY
Qty: 90 TABLET | Refills: 0 | Status: SHIPPED | OUTPATIENT
Start: 2018-05-29 | End: 2018-10-08 | Stop reason: SDUPTHER

## 2018-05-29 RX ORDER — LOSARTAN POTASSIUM 100 MG/1
100 TABLET ORAL DAILY
Qty: 90 TABLET | Refills: 0 | Status: SHIPPED | OUTPATIENT
Start: 2018-05-29 | End: 2018-10-08 | Stop reason: SDUPTHER

## 2018-05-29 RX ORDER — ATORVASTATIN CALCIUM 20 MG/1
20 TABLET, FILM COATED ORAL DAILY
Qty: 90 TABLET | Refills: 0 | Status: SHIPPED | OUTPATIENT
Start: 2018-05-29 | End: 2018-06-07 | Stop reason: SDUPTHER

## 2018-05-29 NOTE — TELEPHONE ENCOUNTER
*speaks Kazakh*    Pt should see cardiology as well as me    pls remind Denny to call and schedule for cardiology appt    thanks

## 2018-06-04 ENCOUNTER — OFFICE VISIT (OUTPATIENT)
Dept: INTERNAL MEDICINE CLINIC | Facility: CLINIC | Age: 63
End: 2018-06-04
Payer: COMMERCIAL

## 2018-06-04 VITALS
RESPIRATION RATE: 16 BRPM | SYSTOLIC BLOOD PRESSURE: 158 MMHG | BODY MASS INDEX: 24.92 KG/M2 | DIASTOLIC BLOOD PRESSURE: 94 MMHG | WEIGHT: 184 LBS | HEART RATE: 60 BPM | HEIGHT: 72 IN | OXYGEN SATURATION: 99 %

## 2018-06-04 DIAGNOSIS — I25.10 CORONARY ARTERY DISEASE INVOLVING NATIVE CORONARY ARTERY OF NATIVE HEART WITHOUT ANGINA PECTORIS: ICD-10-CM

## 2018-06-04 DIAGNOSIS — E11.40 CONTROLLED TYPE 2 DIABETES MELLITUS WITH DIABETIC NEUROPATHY, WITHOUT LONG-TERM CURRENT USE OF INSULIN (HCC): Primary | ICD-10-CM

## 2018-06-04 DIAGNOSIS — E78.2 MIXED HYPERLIPIDEMIA: ICD-10-CM

## 2018-06-04 DIAGNOSIS — I10 BENIGN ESSENTIAL HYPERTENSION: ICD-10-CM

## 2018-06-04 DIAGNOSIS — L84 CORN OF TOE: ICD-10-CM

## 2018-06-04 PROCEDURE — 99214 OFFICE O/P EST MOD 30 MIN: CPT | Performed by: INTERNAL MEDICINE

## 2018-06-04 RX ORDER — AMLODIPINE BESYLATE 5 MG/1
5 TABLET ORAL DAILY
Qty: 90 TABLET | Refills: 0 | Status: SHIPPED | OUTPATIENT
Start: 2018-06-04 | End: 2018-06-11 | Stop reason: SDUPTHER

## 2018-06-04 NOTE — PATIENT INSTRUCTIONS
1  Add amlodipine once a day  2  Return for blood pressure re-check next week  3  Letter will be ready when you return next week  4   Blood work

## 2018-06-04 NOTE — PROGRESS NOTES
Assessment/Plan:     Diagnoses and all orders for this visit:    Controlled type 2 diabetes mellitus with diabetic neuropathy, without long-term current use of insulin ([facility] [address] )  Comments:  DM controlled with diet alone, pt not checking his blood sugars  re-check A1C & DM BW now  Orders:  -     Comprehensive metabolic panel; Future  -     HEMOGLOBIN A1C W/ EAG ESTIMATION; Future  -     Lipid Panel with Direct LDL reflex; Future  -     Microalbumin / creatinine urine ratio  -     Ambulatory referral to Cardiology; Future  -     Ambulatory referral to Podiatry; Future    Mixed hyperlipidemia  Comments:  taking statin and no side effects, c/w current rx  Orders:  -     Comprehensive metabolic panel; Future  -     HEMOGLOBIN A1C W/ EAG ESTIMATION; Future  -     Lipid Panel with Direct LDL reflex; Future  -     Microalbumin / creatinine urine ratio  -     Ambulatory referral to Cardiology; Future    Benign essential hypertension  Comments:  patient stopped amlodipine for unknown reasons(hx of medical non-compliance)  re-start 5mg amlodipine once a day and re-check BP next week with RN visit  Orders:  -     amLODIPine (NORVASC) 5 mg tablet; Take 1 tablet (5 mg total) by mouth daily  -     Ambulatory referral to Cardiology; Future    Coronary artery disease involving native coronary artery of native heart without angina pectoris  Comments:  denies CP/SOB, taking asa/statin/BB and doing well otherwise  advised he needs to see cardiology, he declined against med advise multiple times in past  Orders:  -     Ambulatory referral to Cardiology; Future    Corn of toe  Comments:  comfortable shoes and podiatry eval, ref entered  Orders:  -     Ambulatory referral to Podiatry; Future          Subjective:      Patient ID: Prachi Gallardo is a 60 y o  male  HPI    Here for follow up  Zion Box MA here to translate Maori for Denny    Pt reports poor compliance with diet which he attributes to high blood pressure readings recently  Pat Hartmann reports he is taking 2 medications for blood pressure but should be taking 3(not taking amlodipine)  Not checking blood sugars lately, still going to day program & needs letter stating he has CAD and his medications listed to be eligible for [facility] services  he reports he is willing to see cardiologist to follow up, hasn't been seen in years  Hx of medical non-compliance with treatment plan, medications, lab work and follow up   c/o right lateral foot pain for some weeks or more, no fall/ijury or any other complaints  Past Medical History:   Diagnosis Date    Hyperlipidemia     Hypertension      Vitals:    06/04/18 1505   BP: 158/94   Pulse: 60   Resp: 16   SpO2: 99%   Weight: 83 5 kg (184 lb)   Height: 6' (1 829 m)     Body mass index is 24 95 kg/m²  Current Outpatient Prescriptions:     amLODIPine (NORVASC) 5 mg tablet, Take 1 tablet (5 mg total) by mouth daily, Disp: 90 tablet, Rfl: 0    aspirin 81 MG tablet, Take 1 tablet (81 mg total) by mouth daily, Disp: 90 tablet, Rfl: 0    atorvastatin (LIPITOR) 20 mg tablet, Take 1 tablet (20 mg total) by mouth daily, Disp: 90 tablet, Rfl: 0    Blood Glucose Monitoring Suppl (FREESTYLE FREEDOM LITE) w/Device KIT, by Does not apply route, Disp: , Rfl:     Blood Pressure KIT, by Does not apply route, Disp: , Rfl:     Lancets (FREESTYLE) lancets, by Does not apply route daily, Disp: , Rfl:     losartan (COZAAR) 100 MG tablet, Take 1 tablet (100 mg total) by mouth daily, Disp: 90 tablet, Rfl: 0    metoprolol succinate (TOPROL-XL) 50 mg 24 hr tablet, Take 1 tablet (50 mg total) by mouth daily, Disp: 90 tablet, Rfl: 0  No Known Allergies      Review of Systems   Constitutional: Negative for fever  HENT: Negative for congestion  Eyes: Negative for visual disturbance  Respiratory: Negative for shortness of breath  Cardiovascular: Negative for chest pain  Gastrointestinal: Negative for abdominal pain     Endocrine: Negative for polyuria  Genitourinary: Negative for difficulty urinating  Musculoskeletal: Negative for arthralgias  Neurological: Negative for headaches  Psychiatric/Behavioral: Negative for dysphoric mood  Objective:      /94   Pulse 60   Resp 16   Ht 6' (1 829 m)   Wt 83 5 kg (184 lb)   SpO2 99%   BMI 24 95 kg/m²          Physical Exam   Constitutional: He is oriented to person, place, and time  He appears well-developed and well-nourished  HENT:   Head: Normocephalic and atraumatic  Right Ear: External ear normal    Left Ear: External ear normal    Eyes: Conjunctivae are normal  Pupils are equal, round, and reactive to light  Cardiovascular: Normal rate, regular rhythm and normal heart sounds  No murmur heard  Pulmonary/Chest: Effort normal and breath sounds normal  He has no wheezes  He has no rales  Abdominal: Soft  Bowel sounds are normal  There is no tenderness  Musculoskeletal: He exhibits no edema  Right proximal lateral 5th toe with tender corn without ulceration or erythema present   Lymphadenopathy:     He has no cervical adenopathy  Neurological: He is alert and oriented to person, place, and time  Psychiatric: He has a normal mood and affect  His behavior is normal    Vitals reviewed

## 2018-06-04 NOTE — LETTER
Dear To whom it may concern,    Tamara Otto is a patient of my primary care office  He has a history of coronary artery disease & heart bypass surgery in the past and diabetes mellitus type 2  His medication list is as below  Please feel free to contact my office if any other concerns  I hope this letter finds you well          Current Outpatient Prescriptions:     amLODIPine (NORVASC) 5 mg tablet, Take 1 tablet (5 mg total) by mouth daily, Disp: 90 tablet, Rfl: 0    aspirin 81 MG tablet, Take 1 tablet (81 mg total) by mouth daily, Disp: 90 tablet, Rfl: 0    atorvastatin (LIPITOR) 20 mg tablet, Take 1 tablet (20 mg total) by mouth daily, Disp: 90 tablet, Rfl: 0    Blood Glucose Monitoring Suppl (FREESTYLE FREEDOM LITE) w/Device KIT, by Does not apply route, Disp: , Rfl:     Blood Pressure KIT, by Does not apply route, Disp: , Rfl:     Lancets (FREESTYLE) lancets, by Does not apply route daily, Disp: , Rfl:     losartan (COZAAR) 100 MG tablet, Take 1 tablet (100 mg total) by mouth daily, Disp: 90 tablet, Rfl: 0    metoprolol succinate (TOPROL-XL) 50 mg 24 hr tablet, Take 1 tablet (50 mg total) by mouth daily, Disp: 90 tablet, Rfl: 0      Zarina Shook [address] [facility]

## 2018-06-06 ENCOUNTER — APPOINTMENT (OUTPATIENT)
Dept: LAB | Facility: CLINIC | Age: 63
End: 2018-06-06
Payer: COMMERCIAL

## 2018-06-06 ENCOUNTER — TRANSCRIBE ORDERS (OUTPATIENT)
Dept: LAB | Facility: CLINIC | Age: 63
End: 2018-06-06

## 2018-06-06 DIAGNOSIS — E11.40 CONTROLLED TYPE 2 DIABETES MELLITUS WITH DIABETIC NEUROPATHY, WITHOUT LONG-TERM CURRENT USE OF INSULIN (HCC): ICD-10-CM

## 2018-06-06 DIAGNOSIS — E78.2 MIXED HYPERLIPIDEMIA: ICD-10-CM

## 2018-06-06 LAB
ALBUMIN SERPL BCP-MCNC: 3.9 G/DL (ref 3.5–5)
ALP SERPL-CCNC: 119 U/L (ref 46–116)
ALT SERPL W P-5'-P-CCNC: 50 U/L (ref 12–78)
ANION GAP SERPL CALCULATED.3IONS-SCNC: 8 MMOL/L (ref 4–13)
AST SERPL W P-5'-P-CCNC: 27 U/L (ref 5–45)
BILIRUB SERPL-MCNC: 1.7 MG/DL (ref 0.2–1)
BUN SERPL-MCNC: 11 MG/DL (ref 5–25)
CALCIUM SERPL-MCNC: 9 MG/DL (ref 8.3–10.1)
CHLORIDE SERPL-SCNC: 103 MMOL/L (ref 100–108)
CHOLEST SERPL-MCNC: 97 MG/DL (ref 50–200)
CO2 SERPL-SCNC: 27 MMOL/L (ref 21–32)
CREAT SERPL-MCNC: 0.83 MG/DL (ref 0.6–1.3)
CREAT UR-MCNC: 52.2 MG/DL
EST. AVERAGE GLUCOSE BLD GHB EST-MCNC: 126 MG/DL
GFR SERPL CREATININE-BSD FRML MDRD: 94 ML/MIN/1.73SQ M
GLUCOSE P FAST SERPL-MCNC: 105 MG/DL (ref 65–99)
HBA1C MFR BLD: 6 % (ref 4.2–6.3)
HDLC SERPL-MCNC: 25 MG/DL (ref 40–60)
LDLC SERPL CALC-MCNC: 9 MG/DL (ref 0–100)
MICROALBUMIN UR-MCNC: <5 MG/L (ref 0–20)
MICROALBUMIN/CREAT 24H UR: <10 MG/G CREATININE (ref 0–30)
POTASSIUM SERPL-SCNC: 3.7 MMOL/L (ref 3.5–5.3)
PROT SERPL-MCNC: 7.9 G/DL (ref 6.4–8.2)
SODIUM SERPL-SCNC: 138 MMOL/L (ref 136–145)
TRIGL SERPL-MCNC: 315 MG/DL

## 2018-06-06 PROCEDURE — 82570 ASSAY OF URINE CREATININE: CPT | Performed by: INTERNAL MEDICINE

## 2018-06-06 PROCEDURE — 83036 HEMOGLOBIN GLYCOSYLATED A1C: CPT

## 2018-06-06 PROCEDURE — 80061 LIPID PANEL: CPT

## 2018-06-06 PROCEDURE — 36415 COLL VENOUS BLD VENIPUNCTURE: CPT

## 2018-06-06 PROCEDURE — 80053 COMPREHEN METABOLIC PANEL: CPT

## 2018-06-06 PROCEDURE — 82043 UR ALBUMIN QUANTITATIVE: CPT | Performed by: INTERNAL MEDICINE

## 2018-06-07 ENCOUNTER — TELEPHONE (OUTPATIENT)
Dept: INTERNAL MEDICINE CLINIC | Facility: CLINIC | Age: 63
End: 2018-06-07

## 2018-06-07 DIAGNOSIS — E78.2 MIXED HYPERLIPIDEMIA: ICD-10-CM

## 2018-06-07 DIAGNOSIS — R17 ELEVATED BILIRUBIN: Primary | ICD-10-CM

## 2018-06-07 DIAGNOSIS — R74.8 ELEVATED ALKALINE PHOSPHATASE LEVEL: ICD-10-CM

## 2018-06-07 RX ORDER — ATORVASTATIN CALCIUM 10 MG/1
10 TABLET, FILM COATED ORAL DAILY
Qty: 1 TABLET | Refills: 0 | COMMUNITY
Start: 2018-06-07 | End: 2018-10-08 | Stop reason: SDUPTHER

## 2018-06-07 NOTE — TELEPHONE ENCOUNTER
*PT SPEAKS Kiswahili*  Let patient know, BW looks fine except his cholesterol is a bit too low and he should reduce his atorvastatin to 1/2 tablet (10mg) once a day    Also his Triglycerides are high at 315, has he been drinking alcohol lately or eating sugary foods/sweets?    his liver enzymes are also mildly elevated and he needs to complete an ultrasound test of liver(I e  An imaging test, takes pictures of his liver) & repeat liver BW in 4 weeks    Let me know if ?'s    Results for orders placed or performed in visit on 06/06/18   Comprehensive metabolic panel   Result Value Ref Range    Sodium 138 136 - 145 mmol/L    Potassium 3 7 3 5 - 5 3 mmol/L    Chloride 103 100 - 108 mmol/L    CO2 27 21 - 32 mmol/L    Anion Gap 8 4 - 13 mmol/L    BUN 11 5 - 25 mg/dL    Creatinine 0 83 0 60 - 1 30 mg/dL    Glucose, Fasting 105 (H) 65 - 99 mg/dL    Calcium 9 0 8 3 - 10 1 mg/dL    AST 27 5 - 45 U/L    ALT 50 12 - 78 U/L    Alkaline Phosphatase 119 (H) 46 - 116 U/L    Total Protein 7 9 6 4 - 8 2 g/dL    Albumin 3 9 3 5 - 5 0 g/dL    Total Bilirubin 1 70 (H) 0 20 - 1 00 mg/dL    eGFR 94 ml/min/1 73sq m   HEMOGLOBIN A1C W/ EAG ESTIMATION   Result Value Ref Range    Hemoglobin A1C 6 0 4 2 - 6 3 %     mg/dl   Lipid Panel with Direct LDL reflex   Result Value Ref Range    Cholesterol 97 50 - 200 mg/dL    Triglycerides 315 (H) <=150 mg/dL    HDL, Direct 25 (L) 40 - 60 mg/dL    LDL Calculated 9 0 - 100 mg/dL

## 2018-06-11 ENCOUNTER — CLINICAL SUPPORT (OUTPATIENT)
Dept: INTERNAL MEDICINE CLINIC | Facility: CLINIC | Age: 63
End: 2018-06-11

## 2018-06-11 VITALS — HEART RATE: 60 BPM | SYSTOLIC BLOOD PRESSURE: 154 MMHG | DIASTOLIC BLOOD PRESSURE: 90 MMHG

## 2018-06-11 DIAGNOSIS — I10 BENIGN ESSENTIAL HYPERTENSION: ICD-10-CM

## 2018-06-11 PROCEDURE — RECHECK: Performed by: INTERNAL MEDICINE

## 2018-06-11 RX ORDER — AMLODIPINE BESYLATE 5 MG/1
10 TABLET ORAL DAILY
Qty: 90 TABLET | Refills: 0 | Status: SHIPPED | OUTPATIENT
Start: 2018-06-11 | End: 2018-10-08 | Stop reason: SDUPTHER

## 2018-06-11 NOTE — PATIENT INSTRUCTIONS
1  Increase amlodipine to 10mg once a day  2  Return in 2 weeks for blood pressure re-check  3   Bring all medication bottles in 2 weeks

## 2018-06-11 NOTE — PROGRESS NOTES
Pt is here for bp check     Dr Rashel Topete notified and per Dr Rashel Topete:     1  Increase amlodipine to 10mg once a day  2  Return in 2 weeks for blood pressure re-check  3  Bring all medication bottles in 2 weeks    Jeannette Valadez   Notified pt

## 2018-10-08 DIAGNOSIS — I10 ESSENTIAL HYPERTENSION: ICD-10-CM

## 2018-10-08 DIAGNOSIS — I25.10 CORONARY ARTERY DISEASE INVOLVING NATIVE CORONARY ARTERY OF NATIVE HEART WITHOUT ANGINA PECTORIS: Primary | ICD-10-CM

## 2018-10-08 DIAGNOSIS — E78.2 MIXED HYPERLIPIDEMIA: ICD-10-CM

## 2018-10-08 DIAGNOSIS — I10 BENIGN ESSENTIAL HYPERTENSION: ICD-10-CM

## 2018-10-08 RX ORDER — AMLODIPINE BESYLATE 5 MG/1
5 TABLET ORAL DAILY
Qty: 30 TABLET | Refills: 0 | Status: SHIPPED | OUTPATIENT
Start: 2018-10-08 | End: 2018-10-12 | Stop reason: SDUPTHER

## 2018-10-08 RX ORDER — METOPROLOL SUCCINATE 50 MG/1
50 TABLET, EXTENDED RELEASE ORAL DAILY
Qty: 30 TABLET | Refills: 0 | Status: SHIPPED | OUTPATIENT
Start: 2018-10-08 | End: 2018-10-12 | Stop reason: SDUPTHER

## 2018-10-08 RX ORDER — LOSARTAN POTASSIUM 100 MG/1
100 TABLET ORAL DAILY
Qty: 30 TABLET | Refills: 0 | Status: SHIPPED | OUTPATIENT
Start: 2018-10-08 | End: 2018-10-12 | Stop reason: SDUPTHER

## 2018-10-08 RX ORDER — ATORVASTATIN CALCIUM 10 MG/1
10 TABLET, FILM COATED ORAL DAILY
Qty: 30 TABLET | Refills: 0 | Status: SHIPPED | OUTPATIENT
Start: 2018-10-08 | End: 2018-10-12 | Stop reason: SDUPTHER

## 2018-10-08 NOTE — TELEPHONE ENCOUNTER
Pt speaks Faroese and will need language line    He is overdue for OV, blood work and ultrasound of liver    We tried calling him in June but got no answer(see chart)    pls give him the message in chart from June 7th & advise to:    Complete Blood work  Ultrasound, give him details on how to schedule  Make an appt to see me after this and BRING ALL HIS MEDICATION BOTTLES TO his appt

## 2018-10-12 DIAGNOSIS — E78.2 MIXED HYPERLIPIDEMIA: ICD-10-CM

## 2018-10-12 DIAGNOSIS — E11.40 CONTROLLED TYPE 2 DIABETES MELLITUS WITH DIABETIC NEUROPATHY, WITHOUT LONG-TERM CURRENT USE OF INSULIN (HCC): Primary | ICD-10-CM

## 2018-10-12 DIAGNOSIS — I10 BENIGN ESSENTIAL HYPERTENSION: ICD-10-CM

## 2018-10-12 DIAGNOSIS — I25.10 CORONARY ARTERY DISEASE INVOLVING NATIVE CORONARY ARTERY OF NATIVE HEART WITHOUT ANGINA PECTORIS: ICD-10-CM

## 2018-10-12 RX ORDER — AMLODIPINE BESYLATE 5 MG/1
5 TABLET ORAL DAILY
Qty: 30 TABLET | Refills: 0 | Status: SHIPPED | OUTPATIENT
Start: 2018-10-12 | End: 2018-11-12 | Stop reason: SDUPTHER

## 2018-10-12 RX ORDER — LOSARTAN POTASSIUM 100 MG/1
100 TABLET ORAL DAILY
Qty: 30 TABLET | Refills: 0 | Status: SHIPPED | OUTPATIENT
Start: 2018-10-12 | End: 2018-11-12 | Stop reason: SDUPTHER

## 2018-10-12 RX ORDER — ATORVASTATIN CALCIUM 10 MG/1
10 TABLET, FILM COATED ORAL DAILY
Qty: 30 TABLET | Refills: 0 | Status: SHIPPED | OUTPATIENT
Start: 2018-10-12 | End: 2018-11-12 | Stop reason: SDUPTHER

## 2018-10-12 RX ORDER — METOPROLOL SUCCINATE 50 MG/1
50 TABLET, EXTENDED RELEASE ORAL DAILY
Qty: 30 TABLET | Refills: 0 | Status: SHIPPED | OUTPATIENT
Start: 2018-10-12 | End: 2018-11-12 | Stop reason: SDUPTHER

## 2018-10-12 NOTE — TELEPHONE ENCOUNTER
ADV'D PT  THAT MEDS WERE SENT TO [facility] VIA INTREPRETOR AND ALSO THAT HE NEEDS TO DO BW AND SCHEDULE U/S OF LIVER VIA INTREPRETOR AND ALSO SPOKE W/ PT 'S SON ON PT 'S PHONE WHO SPEAKS ENGLISH AND ADV'D TO TELL PT  HIS BW RESULTS FROM June AND TO ADVISE PT  OF DR RAIN'S INSTRUCTIONS  SON UNDERSTANDS AND SPOKE TO PT  ON THE PHONE WHILE PT  HERE AT THE OFFICE

## 2018-10-12 NOTE — TELEPHONE ENCOUNTER
[facility]   CAN YOU PLEASE SEND TO [facility] AT [place],

## 2018-10-16 ENCOUNTER — APPOINTMENT (OUTPATIENT)
Dept: LAB | Facility: CLINIC | Age: 63
End: 2018-10-16
Payer: COMMERCIAL

## 2018-10-16 DIAGNOSIS — I10 BENIGN ESSENTIAL HYPERTENSION: ICD-10-CM

## 2018-10-16 DIAGNOSIS — R74.8 ELEVATED ALKALINE PHOSPHATASE LEVEL: ICD-10-CM

## 2018-10-16 DIAGNOSIS — R17 ELEVATED BILIRUBIN: ICD-10-CM

## 2018-10-16 DIAGNOSIS — E11.40 CONTROLLED TYPE 2 DIABETES MELLITUS WITH DIABETIC NEUROPATHY, WITHOUT LONG-TERM CURRENT USE OF INSULIN (HCC): ICD-10-CM

## 2018-10-16 DIAGNOSIS — E78.2 MIXED HYPERLIPIDEMIA: ICD-10-CM

## 2018-10-16 LAB
ALBUMIN SERPL BCP-MCNC: 3.9 G/DL (ref 3.5–5)
ALP SERPL-CCNC: 111 U/L (ref 46–116)
ALT SERPL W P-5'-P-CCNC: 46 U/L (ref 12–78)
ANION GAP SERPL CALCULATED.3IONS-SCNC: 9 MMOL/L (ref 4–13)
AST SERPL W P-5'-P-CCNC: 21 U/L (ref 5–45)
BILIRUB DIRECT SERPL-MCNC: 0.39 MG/DL (ref 0–0.2)
BILIRUB SERPL-MCNC: 2 MG/DL (ref 0.2–1)
BUN SERPL-MCNC: 15 MG/DL (ref 5–25)
CALCIUM SERPL-MCNC: 9.2 MG/DL (ref 8.3–10.1)
CHLORIDE SERPL-SCNC: 104 MMOL/L (ref 100–108)
CHOLEST SERPL-MCNC: 109 MG/DL (ref 50–200)
CO2 SERPL-SCNC: 26 MMOL/L (ref 21–32)
CREAT SERPL-MCNC: 0.93 MG/DL (ref 0.6–1.3)
CREAT UR-MCNC: 204 MG/DL
EST. AVERAGE GLUCOSE BLD GHB EST-MCNC: 117 MG/DL
GFR SERPL CREATININE-BSD FRML MDRD: 86 ML/MIN/1.73SQ M
GLUCOSE P FAST SERPL-MCNC: 117 MG/DL (ref 65–99)
HBA1C MFR BLD: 5.7 % (ref 4.2–6.3)
HDLC SERPL-MCNC: 28 MG/DL (ref 40–60)
LDLC SERPL CALC-MCNC: 29 MG/DL (ref 0–100)
MICROALBUMIN UR-MCNC: 57.9 MG/L (ref 0–20)
MICROALBUMIN/CREAT 24H UR: 28 MG/G CREATININE (ref 0–30)
POTASSIUM SERPL-SCNC: 4.2 MMOL/L (ref 3.5–5.3)
PROT SERPL-MCNC: 8.2 G/DL (ref 6.4–8.2)
SODIUM SERPL-SCNC: 139 MMOL/L (ref 136–145)
TRIGL SERPL-MCNC: 258 MG/DL

## 2018-10-16 PROCEDURE — 82570 ASSAY OF URINE CREATININE: CPT

## 2018-10-16 PROCEDURE — 83036 HEMOGLOBIN GLYCOSYLATED A1C: CPT

## 2018-10-16 PROCEDURE — 80061 LIPID PANEL: CPT

## 2018-10-16 PROCEDURE — 80048 BASIC METABOLIC PNL TOTAL CA: CPT

## 2018-10-16 PROCEDURE — 36415 COLL VENOUS BLD VENIPUNCTURE: CPT

## 2018-10-16 PROCEDURE — 80076 HEPATIC FUNCTION PANEL: CPT

## 2018-10-16 PROCEDURE — 82043 UR ALBUMIN QUANTITATIVE: CPT

## 2018-10-17 ENCOUNTER — OFFICE VISIT (OUTPATIENT)
Dept: INTERNAL MEDICINE CLINIC | Facility: CLINIC | Age: 63
End: 2018-10-17
Payer: COMMERCIAL

## 2018-10-17 VITALS
WEIGHT: 180.8 LBS | SYSTOLIC BLOOD PRESSURE: 138 MMHG | HEIGHT: 72 IN | RESPIRATION RATE: 18 BRPM | DIASTOLIC BLOOD PRESSURE: 86 MMHG | HEART RATE: 82 BPM | OXYGEN SATURATION: 97 % | TEMPERATURE: 97.9 F | BODY MASS INDEX: 24.49 KG/M2

## 2018-10-17 DIAGNOSIS — J06.9 UPPER RESPIRATORY TRACT INFECTION, UNSPECIFIED TYPE: Primary | ICD-10-CM

## 2018-10-17 DIAGNOSIS — R17 ELEVATED BILIRUBIN: ICD-10-CM

## 2018-10-17 DIAGNOSIS — E78.2 MIXED HYPERLIPIDEMIA: ICD-10-CM

## 2018-10-17 DIAGNOSIS — I25.10 CORONARY ARTERY DISEASE INVOLVING NATIVE CORONARY ARTERY OF NATIVE HEART WITHOUT ANGINA PECTORIS: ICD-10-CM

## 2018-10-17 DIAGNOSIS — I10 BENIGN ESSENTIAL HYPERTENSION: ICD-10-CM

## 2018-10-17 DIAGNOSIS — E11.40 CONTROLLED TYPE 2 DIABETES MELLITUS WITH DIABETIC NEUROPATHY, WITHOUT LONG-TERM CURRENT USE OF INSULIN (HCC): ICD-10-CM

## 2018-10-17 DIAGNOSIS — R41.3 MEMORY DISTURBANCE: ICD-10-CM

## 2018-10-17 PROCEDURE — 3725F SCREEN DEPRESSION PERFORMED: CPT | Performed by: INTERNAL MEDICINE

## 2018-10-17 PROCEDURE — 99215 OFFICE O/P EST HI 40 MIN: CPT | Performed by: INTERNAL MEDICINE

## 2018-10-17 RX ORDER — BENZONATATE 100 MG/1
100 CAPSULE ORAL EVERY 8 HOURS PRN
Qty: 30 CAPSULE | Refills: 0 | Status: ON HOLD | OUTPATIENT
Start: 2018-10-17 | End: 2020-04-01 | Stop reason: CLARIF

## 2018-10-17 NOTE — PROGRESS NOTES
Assessment/Plan:     Diagnoses and all orders for this visit:    Upper respiratory tract infection, unspecified type  Comments:  suspect viral in etiology -> rest/hydration/analgesics/decongestants/cough rx ordered  precautions advised  Orders:  -     benzonatate (TESSALON PERLES) 100 mg capsule; Take 1 capsule (100 mg total) by mouth every 8 (eight) hours as needed for cough    Memory disturbance  Comments:  etiology of sx unclear, neuro eval and MRI brain ordered  Orders:  -     Ambulatory referral to Neurology; Future  -     MRI brain wo contrast; Future    Benign essential hypertension  Comments:  BP doing well, c/w CCB/BB/ARB    Mixed hyperlipidemia  Comments:  taking statin & no SE, c/w rx    Controlled type 2 diabetes mellitus with diabetic neuropathy, without long-term current use of insulin (HCC)  Comments:  BS doing well off metfomin, c/w diet controlled DM  Orders:  -     Ambulatory referral to Neurology; Future    Coronary artery disease involving native coronary artery of native heart without angina pectoris  Comments:  asymptomatic, c/w asa/statin/BB/ARB and cardiology eval  Orders:  -     Ambulatory referral to Neurology; Future    Elevated bilirubin  Comments:  didn't complete liver US as advised, pt's son will ensure he completes test      Spent >40 mins with patient including >50% of time counseling/coordination of care      Subjective:      Patient ID: Matthew Christopher is a 58 y o  male  HPI    Here with son who helps pt translate Kiswahili per pt request   Arrived as walk-in requesting to be see 'right away' for cold symptoms  No show for last 2 appts with me  Pt does not return our phone calls or letters  Poor historian, but Zonia Bright has been having 4-5 days of cough, some congestion, runny nose and feeling unwell  No SOB, fever or CP except with coughing  He hasn't taken any rx for URI sx and is starting to feel better today      Son also concerned about pt's ability to take care of himself  denny went to [facility] for 3 mos and didn't take care of his health or take his meds at that time  All of his family is in [place]  He attends a day program while in here and son rec'd letter in mail from Denny's previous PCP in [place] which concerned him that [facility] may have dementia  Denny denies depression, PHQ-9 score of 8, no SI  Son states that Denny cannot read Kinyarwanda for comprehension and 'may have' completed first grade  No other complaints    Past Medical History:   Diagnosis Date    Hyperlipidemia     Hypertension      Vitals:    10/17/18 1026   BP: 138/86   Pulse: 82   Resp: 18   Temp: 97 9 °F (36 6 °C)   SpO2: 97%   Weight: 82 kg (180 lb 12 8 oz)   Height: 6' (1 829 m)     Body mass index is 24 52 kg/m²  Current Outpatient Prescriptions:     amLODIPine (NORVASC) 5 mg tablet, Take 1 tablet (5 mg total) by mouth daily, Disp: 30 tablet, Rfl: 0    aspirin 81 MG tablet, Take 1 tablet (81 mg total) by mouth daily, Disp: 90 tablet, Rfl: 0    atorvastatin (LIPITOR) 10 mg tablet, Take 1 tablet (10 mg total) by mouth daily, Disp: 30 tablet, Rfl: 0    Blood Glucose Monitoring Suppl (FREESTYLE FREEDOM LITE) w/Device KIT, by Does not apply route, Disp: , Rfl:     Blood Pressure KIT, by Does not apply route, Disp: , Rfl:     Lancets (FREESTYLE) lancets, by Does not apply route daily, Disp: , Rfl:     losartan (COZAAR) 100 MG tablet, Take 1 tablet (100 mg total) by mouth daily, Disp: 30 tablet, Rfl: 0    metoprolol succinate (TOPROL-XL) 50 mg 24 hr tablet, Take 1 tablet (50 mg total) by mouth daily, Disp: 30 tablet, Rfl: 0    benzonatate (TESSALON PERLES) 100 mg capsule, Take 1 capsule (100 mg total) by mouth every 8 (eight) hours as needed for cough, Disp: 30 capsule, Rfl: 0  No Known Allergies      Review of Systems   Constitutional: Negative for fever  HENT: Positive for congestion, postnasal drip, rhinorrhea and sore throat  Eyes: Negative for visual disturbance  Respiratory: Positive for cough  Negative for shortness of breath  Cardiovascular: Negative for chest pain  Gastrointestinal: Negative for abdominal pain  Genitourinary: Negative for dysuria  Musculoskeletal: Negative for arthralgias  Skin: Negative for rash  Neurological: Negative for headaches  Psychiatric/Behavioral: Negative for dysphoric mood  Objective:      /86   Pulse 82   Temp 97 9 °F (36 6 °C)   Resp 18   Ht 6' (1 829 m)   Wt 82 kg (180 lb 12 8 oz)   SpO2 97%   BMI 24 52 kg/m²          Physical Exam   Constitutional: He appears well-developed and well-nourished  HENT:   Head: Normocephalic and atraumatic  Nose: Mucosal edema present  Right sinus exhibits no maxillary sinus tenderness and no frontal sinus tenderness  Left sinus exhibits no maxillary sinus tenderness and no frontal sinus tenderness  Mouth/Throat: Mucous membranes are dry  Posterior oropharyngeal erythema present  Eyes: Pupils are equal, round, and reactive to light  Cardiovascular: Normal rate, regular rhythm and normal heart sounds  No murmur heard  Pulmonary/Chest: Effort normal and breath sounds normal  He has no wheezes  He has no rales  Abdominal: Soft  Bowel sounds are normal  There is no tenderness  Musculoskeletal: He exhibits no edema  Lymphadenopathy:     He has no cervical adenopathy  Neurological: He is alert  He has normal strength  He displays normal reflexes  No cranial nerve deficit  Coordination and gait normal    Has trouble following commands & even when translated into Khmer   Psychiatric: He has a normal mood and affect  His behavior is normal    Vitals reviewed          PHQ-9 Depression Screening    PHQ-9:    Frequency of the following problems over the past two weeks:       Little interest or pleasure in doing things:  2 - more than half the days  Feeling down, depressed, or hopeless:  1 - several days  Trouble falling or staying asleep, or sleeping too much: 2 - more than half the days  Feeling tired or having little energy:  1 - several days  Poor appetite or overeatin - several days  Feeling bad about yourself - or that you are a failure or have let yourself or your family down:  0 - not at all  Trouble concentrating on things, such as reading the newspaper or watching television:  1 - several days  Moving or speaking so slowly that other people could have noticed   Or the opposite - being so fidgety or restless that you have been moving around a lot more than usual:  0 - not at all  Thoughts that you would be better off dead, or of hurting yourself in some way:  0 - not at all  PHQ-2 Score:  3  PHQ-9 Score:  8       Results for orders placed or performed in visit on 10/16/18   Hemoglobin A1c   Result Value Ref Range    Hemoglobin A1C 5 7 4 2 - 6 3 %     mg/dl   Hepatic function panel   Result Value Ref Range    Total Bilirubin 2 00 (H) 0 20 - 1 00 mg/dL    Bilirubin, Direct 0 39 (H) 0 00 - 0 20 mg/dL    Alkaline Phosphatase 111 46 - 116 U/L    AST 21 5 - 45 U/L    ALT 46 12 - 78 U/L    Total Protein 8 2 6 4 - 8 2 g/dL    Albumin 3 9 3 5 - 5 0 g/dL   Basic metabolic panel   Result Value Ref Range    Sodium 139 136 - 145 mmol/L    Potassium 4 2 3 5 - 5 3 mmol/L    Chloride 104 100 - 108 mmol/L    CO2 26 21 - 32 mmol/L    ANION GAP 9 4 - 13 mmol/L    BUN 15 5 - 25 mg/dL    Creatinine 0 93 0 60 - 1 30 mg/dL    Glucose, Fasting 117 (H) 65 - 99 mg/dL    Calcium 9 2 8 3 - 10 1 mg/dL    eGFR 86 ml/min/1 73sq m   Lipid Panel with Direct LDL reflex   Result Value Ref Range    Cholesterol 109 50 - 200 mg/dL    Triglycerides 258 (H) <=150 mg/dL    HDL, Direct 28 (L) 40 - 60 mg/dL    LDL Calculated 29 0 - 100 mg/dL

## 2018-10-17 NOTE — PATIENT INSTRUCTIONS
1  Cough suppressants, throat lozenges, plenty of fluids  2  Cardiology appointment  3  MRI brain  4  Neurology appointment  5  Return after the above is complete in 3 months    Infección respiratoria superior   CUIDADO AMBULATORIO:   Nanci infección de las vías respiratorias superiores  también se conoce vashti resfriado común  Puede afectar rome Will Chars, oídos y los senos paranasales  Los signos y síntomas más comunes incluyen los siguientes:  Los síntomas del resfriado generalmente son Waynette Finer graves lacey los primeros 3 a 5 días  Puede presentar cualquiera de los siguientes signos o síntomas:  · Secreción nasal o nariz tapada    · Estornudos y tos    · [place] irritada o ronquera    · Ojos enrojecidos, llorosos e irritados    · [employer]     · Kesha Jews y fiebre    · Dolor de [place], vaibhav corporales o músculos adoloridos  Busque atención médica de inmediato si:   · Usted tiene dolor torácico y dificultad para respirar  Pregúntele a rome Reyne Rave vitaminas y minerales son adecuados para usted  · Usted tiene fiebre de más de 102ºF [facility])  · [place] garganta irritada empeora o usted ve manchas kailey o chavez en rome garganta  · Lydia síntomas empeoran después de 3 a 5 días o rome resfriado no mejora en 14 días  · Usted tiene sarpullido en alguna parte de rome piel  · Usted tiene bultos grandes y sensible en rome justin    · Usted tiene secreción espesa de color maren o amarillo proveniente de rome nariz  · Usted expectora mucosidad de color amarillo, maren o con [place]  · Usted vomita por más de 24 horas y no puede retener líquidos en el estómago  · Usted tiene un grave dolor de oído  · Usted tiene preguntas o inquietudes acerca de rome condición o cuidado  El tratamiento para un resfriado:  No hay james para el resfriado común  Los resfriados son provocados por virus y no mejoran con antibióticos  [address] en 7 a 14 días   Puede que usted siga tosiendo por 2 a 3 semanas  Lo siguiente podría ayudar a disminuir ivet síntomas:    · Los jarabes para la tos  ayudan a reducir la tos  Pregúntele a rome médico cuál tipo de medicamento para la tos es mejor para usted  · Acetaminofeno:  stefani el dolor y baja la fiebre  Está disponible sin receta médica  Pregunte la cantidad y la frecuencia con que debe tomarlos  [address]  Celsa las etiquetas de todos los demás medicamentos que esté usando para saber si también contienen acetaminofén, o pregunte a rome médico o farmacéutico  El acetaminofén puede causar daño en el hígado cuando no se lashay de forma correcta  No use más de 4 gramos (4000 miligramos) en total de acetaminofeno en un día  Maneje rome resfriado:   · Descanse el mayor tiempo posible  Empiece a hacer un poco más día a día  · [employer] líquidos vashti se le indique  Los líquidos le ayudarán a aflojar y disolver la mucosidad para que la pueda expectorar  Los líquidos ayudarán a evitar la deshidratación  Los líquidos que ayudan a prevenir la deshidratación pueden ser Tiesha Cargo y caldo  No tome líquidos que contienen cafeína  La cafeína puede aumentar el riesgo de deshidratación  Pregúntele a rome médico cuál es la cantidad de líquido que necesita ingerir a diario  · Alivie el dolor de garganta  Jailene gárgaras de agua tibia con sal  [place] ayuda a aliviar el dolor de garganta  Prepare agua salina disolviendo ¼ de cucharada de sal a 1 taza de agua tibia  Usted puede también chupar dulces duros o pastillas para la garganta  Usted puede usar aerosol para el dolor de garganta  · Use un humidificador o vaporizador  Use un humidificador de jace frío o un vaporizador para elevar la humedad en rome casa  [place] podría ayudarle a respirar más fácilmente y al mismo tiempo disminuir la tos  · Use gotas nasales de solución salina vashti se le haya indicado  Estas ayudan a [place]       · Aplique vaselina en la parte externa alrededor de las fosas nasales  Esta puede disminuir la irritación de sonarse la nariz  · No fume  La nicotina y otros químicos en los cigarrillos y cigarros pueden empeorar ivet síntomas  También pueden causar infecciones vashti la bronquitis o la neumonía  Pida información a esteban médico si usted actualmente fuma y necesita ayuda para dejar de fumar  Los cigarrillos electrónicos o tabaco sin humo todavía contienen nicotina  Consulte con esteban médico antes de [employer]  Evite transmitir esteban resfriado a los demás:   · Trate de mantenerse alejado de otras personas lacey los primeros 2 a 3 días de esteban resfriado cuando éste es más fácil de transmitir  · No comparta alimentos o bebidas  · No comparta toallas de mano con otros miembros de stella en el [facility]  · [employer] frecuentemente, especialmente después de sonarse la nariz  [place] la espalda a otras personas y cúbrase la boca y la nariz con un pañuelo desechable cuando estornuda o tose  Acuda a ivet consultas de control con esteban médico según le indicaron  Anote ivet preguntas para que se acuerde de hacerlas lacey ivet visitas  © 2017 [address] Information is for End User's use only and may not be sold, redistributed or otherwise used for commercial purposes  All illustrations and images included in CareNotes® are the copyrighted property of A D A M , Inc  or Archie Fowler  Esta información es sólo para uso en educación  Esteban intención no es darle un consejo médico sobre enfermedades o tratamientos  Colsulte con esteban Kenia Shouts farmacéutico antes de seguir cualquier régimen médico para saber si es seguro y efectivo para usted

## 2018-11-12 DIAGNOSIS — I10 BENIGN ESSENTIAL HYPERTENSION: ICD-10-CM

## 2018-11-12 DIAGNOSIS — E78.2 MIXED HYPERLIPIDEMIA: ICD-10-CM

## 2018-11-12 DIAGNOSIS — I25.10 CORONARY ARTERY DISEASE INVOLVING NATIVE CORONARY ARTERY OF NATIVE HEART WITHOUT ANGINA PECTORIS: ICD-10-CM

## 2018-11-12 RX ORDER — ATORVASTATIN CALCIUM 10 MG/1
10 TABLET, FILM COATED ORAL DAILY
Qty: 30 TABLET | Refills: 2 | Status: ON HOLD | OUTPATIENT
Start: 2018-11-12 | End: 2020-04-01 | Stop reason: SDUPTHER

## 2018-11-12 RX ORDER — AMLODIPINE BESYLATE 5 MG/1
5 TABLET ORAL DAILY
Qty: 30 TABLET | Refills: 2 | Status: ON HOLD | OUTPATIENT
Start: 2018-11-12 | End: 2020-04-01 | Stop reason: CLARIF

## 2018-11-12 RX ORDER — METOPROLOL SUCCINATE 50 MG/1
50 TABLET, EXTENDED RELEASE ORAL DAILY
Qty: 30 TABLET | Refills: 2 | Status: SHIPPED | OUTPATIENT
Start: 2018-11-12 | End: 2021-03-22 | Stop reason: SDUPTHER

## 2018-11-12 RX ORDER — LOSARTAN POTASSIUM 100 MG/1
100 TABLET ORAL DAILY
Qty: 30 TABLET | Refills: 2 | Status: ON HOLD | OUTPATIENT
Start: 2018-11-12 | End: 2020-04-01 | Stop reason: SDUPTHER

## 2018-12-07 DIAGNOSIS — E78.2 MIXED HYPERLIPIDEMIA: ICD-10-CM

## 2018-12-07 DIAGNOSIS — I10 BENIGN ESSENTIAL HYPERTENSION: ICD-10-CM

## 2018-12-07 DIAGNOSIS — I25.10 CORONARY ARTERY DISEASE INVOLVING NATIVE CORONARY ARTERY OF NATIVE HEART WITHOUT ANGINA PECTORIS: ICD-10-CM

## 2018-12-07 RX ORDER — ATORVASTATIN CALCIUM 10 MG/1
10 TABLET, FILM COATED ORAL DAILY
Qty: 30 TABLET | Refills: 0 | Status: CANCELLED | OUTPATIENT
Start: 2018-12-07

## 2018-12-07 RX ORDER — METOPROLOL SUCCINATE 50 MG/1
50 TABLET, EXTENDED RELEASE ORAL DAILY
Qty: 30 TABLET | Refills: 0 | Status: CANCELLED | OUTPATIENT
Start: 2018-12-07

## 2018-12-07 RX ORDER — LOSARTAN POTASSIUM 100 MG/1
100 TABLET ORAL DAILY
Qty: 30 TABLET | Refills: 0 | Status: CANCELLED | OUTPATIENT
Start: 2018-12-07

## 2018-12-07 RX ORDER — AMLODIPINE BESYLATE 5 MG/1
5 TABLET ORAL DAILY
Qty: 30 TABLET | Refills: 0 | Status: CANCELLED | OUTPATIENT
Start: 2018-12-07

## 2019-04-23 ENCOUNTER — TELEPHONE (OUTPATIENT)
Dept: PHYSICAL THERAPY | Facility: OTHER | Age: 64
End: 2019-04-23

## 2019-04-23 ENCOUNTER — HOSPITAL ENCOUNTER (EMERGENCY)
Facility: HOSPITAL | Age: 64
Discharge: HOME | End: 2019-04-23
Attending: EMERGENCY MEDICINE | Admitting: EMERGENCY MEDICINE
Payer: COMMERCIAL

## 2019-04-23 ENCOUNTER — APPOINTMENT (EMERGENCY)
Dept: CT IMAGING | Facility: HOSPITAL | Age: 64
End: 2019-04-23
Payer: COMMERCIAL

## 2019-04-23 VITALS
WEIGHT: 180.78 LBS | OXYGEN SATURATION: 100 % | BODY MASS INDEX: 24.52 KG/M2 | TEMPERATURE: 98.1 F | DIASTOLIC BLOOD PRESSURE: 83 MMHG | RESPIRATION RATE: 16 BRPM | SYSTOLIC BLOOD PRESSURE: 154 MMHG | HEART RATE: 52 BPM

## 2019-04-23 DIAGNOSIS — M54.9 BACK PAIN: Primary | ICD-10-CM

## 2019-04-23 DIAGNOSIS — N28.1 RENAL CYST: ICD-10-CM

## 2019-04-23 LAB
BACTERIA UR QL AUTO: ABNORMAL /HPF
BILIRUB UR QL STRIP: NEGATIVE
CAOX CRY URNS QL MICRO: ABNORMAL /HPF
CLARITY UR: CLEAR
COLOR UR: YELLOW
GLUCOSE UR STRIP-MCNC: NEGATIVE MG/DL
HGB UR QL STRIP.AUTO: ABNORMAL
KETONES UR STRIP-MCNC: NEGATIVE MG/DL
LEUKOCYTE ESTERASE UR QL STRIP: NEGATIVE
NITRITE UR QL STRIP: NEGATIVE
NON-SQ EPI CELLS URNS QL MICRO: ABNORMAL /HPF
PH UR STRIP.AUTO: 7.5 [PH] (ref 4.5–8)
PROT UR STRIP-MCNC: NEGATIVE MG/DL
RBC #/AREA URNS AUTO: ABNORMAL /HPF
SP GR UR STRIP.AUTO: 1.01 (ref 1–1.03)
UROBILINOGEN UR QL STRIP.AUTO: 1 E.U./DL
WBC #/AREA URNS AUTO: ABNORMAL /HPF

## 2019-04-23 PROCEDURE — 99284 EMERGENCY DEPT VISIT MOD MDM: CPT

## 2019-04-23 PROCEDURE — 81001 URINALYSIS AUTO W/SCOPE: CPT

## 2019-04-23 PROCEDURE — 96372 THER/PROPH/DIAG INJ SC/IM: CPT

## 2019-04-23 PROCEDURE — 74176 CT ABD & PELVIS W/O CONTRAST: CPT

## 2019-04-23 PROCEDURE — 99284 EMERGENCY DEPT VISIT MOD MDM: CPT | Performed by: PHYSICIAN ASSISTANT

## 2019-04-23 RX ORDER — ACETAMINOPHEN 500 MG
1000 TABLET ORAL EVERY 6 HOURS PRN
Qty: 30 TABLET | Refills: 0 | Status: SHIPPED | OUTPATIENT
Start: 2019-04-23 | End: 2020-04-07 | Stop reason: HOSPADM

## 2019-04-23 RX ORDER — KETOROLAC TROMETHAMINE 30 MG/ML
15 INJECTION, SOLUTION INTRAMUSCULAR; INTRAVENOUS ONCE
Status: COMPLETED | OUTPATIENT
Start: 2019-04-23 | End: 2019-04-23

## 2019-04-23 RX ORDER — METHOCARBAMOL 500 MG/1
500 TABLET, FILM COATED ORAL ONCE
Status: COMPLETED | OUTPATIENT
Start: 2019-04-23 | End: 2019-04-23

## 2019-04-23 RX ORDER — METHOCARBAMOL 500 MG/1
500 TABLET, FILM COATED ORAL 2 TIMES DAILY
Qty: 20 TABLET | Refills: 0 | Status: ON HOLD | OUTPATIENT
Start: 2019-04-23 | End: 2020-04-01 | Stop reason: CLARIF

## 2019-04-23 RX ORDER — NAPROXEN 500 MG/1
500 TABLET ORAL 2 TIMES DAILY WITH MEALS
Qty: 30 TABLET | Refills: 0 | Status: ON HOLD | OUTPATIENT
Start: 2019-04-23 | End: 2020-04-01 | Stop reason: CLARIF

## 2019-04-23 RX ADMIN — METHOCARBAMOL TABLETS 500 MG: 500 TABLET, COATED ORAL at 09:14

## 2019-04-23 RX ADMIN — KETOROLAC TROMETHAMINE 15 MG: 30 INJECTION, SOLUTION INTRAMUSCULAR; INTRAVENOUS at 09:14

## 2019-04-29 ENCOUNTER — TELEPHONE (OUTPATIENT)
Dept: INTERNAL MEDICINE CLINIC | Facility: CLINIC | Age: 64
End: 2019-04-29

## 2019-04-29 DIAGNOSIS — N28.9 KIDNEY LESION: Primary | ICD-10-CM

## 2019-04-29 DIAGNOSIS — N40.0 PROSTATE ENLARGEMENT: ICD-10-CM

## 2019-05-07 ENCOUNTER — TELEPHONE (OUTPATIENT)
Dept: UROLOGY | Facility: MEDICAL CENTER | Age: 64
End: 2019-05-07

## 2019-07-29 ENCOUNTER — OFFICE VISIT (OUTPATIENT)
Dept: FAMILY MEDICINE CLINIC | Facility: CLINIC | Age: 64
End: 2019-07-29

## 2019-07-29 VITALS
DIASTOLIC BLOOD PRESSURE: 80 MMHG | HEART RATE: 68 BPM | TEMPERATURE: 96.7 F | SYSTOLIC BLOOD PRESSURE: 130 MMHG | OXYGEN SATURATION: 96 % | BODY MASS INDEX: 24.24 KG/M2 | HEIGHT: 72 IN | WEIGHT: 179 LBS | RESPIRATION RATE: 18 BRPM

## 2019-07-29 DIAGNOSIS — I73.9 CLAUDICATION OF BOTH LOWER EXTREMITIES (HCC): Primary | ICD-10-CM

## 2019-07-29 PROCEDURE — 99203 OFFICE O/P NEW LOW 30 MIN: CPT | Performed by: PODIATRIST

## 2019-07-29 NOTE — PROGRESS NOTES
Assessment/Plan:     Diagnoses and all orders for this visit:    Claudication of both lower extremities ([facility] [address] )  -     VAS lower limb arterial duplex, complete bilateral; Future      PLAN:  - Patient was seen evaluated and treated with all his questions and concerns addressed  - Patient was educated on claudication pain and his potential for vascular problems given his history and current pedal exam    - A Rx for LEADs were ordered and handed to the patient along with instructions for how to schedule and get the studies done  - He was re-appointed for approximately 1 month, but ONLY after he gets his LEAD studies done  Subjective:      Patient ID: Susi Salinas is a 58 y o  male  Mr Ariane Brown, presents today complaining of b/l calf pain  He reports that he has been having this pain ever since his Coronary Bypass Sx which he states was approximately 10 years ago  He reports that he is unable to walk for periods of longer than 3-4 blocks without experiencing leg pain, which improves with rest  He has not tried anything for the pain  A  was used named [facility] #[id number]  Patient denies further pedal complaints and any nausea, fever, chills, shortness of breath, chest pain at this time  The following portions of the patient's history were reviewed and updated as appropriate: He  has a past medical history of Hyperlipidemia and Hypertension    He   Patient Active Problem List    Diagnosis Date Noted    Elevated bilirubin 06/07/2018    Elevated alkaline phosphatase level 06/07/2018    Mixed hyperlipidemia 02/02/2018    Atherosclerosis of native artery of both lower extremities with intermittent claudication ([facility] [address] ) 09/28/2017    Peripheral arterial disease ([facility] [address] ) 09/25/2017    Severe carpal tunnel syndrome of right wrist 09/13/2017    Elevated total protein 08/24/2017    Controlled type 2 diabetes mellitus with diabetic neuropathy, without long-term current use of insulin ([address] ) 06/16/2017    Other constipation 05/10/2017    Benign essential hypertension 05/04/2017    Coronary artery disease 05/04/2017    Cervical spondylolysis 05/04/2017    Incomplete emptying of bladder 05/04/2017    Radicular pain 05/04/2017    Seasonal allergies 05/04/2017     He  has a past surgical history that includes Cardiac surgery and Coronary artery bypass graft (2009)  His family history includes Diabetes in his father and mother; Heart disease in his father and mother; Hypertension in his brother; Mental illness in his family       Review of Systems   Constitutional: Negative for chills and fever  Respiratory: Negative for chest tightness and shortness of breath  Cardiovascular: Negative for chest pain and leg swelling  Calf claudication pain bilateral   Gastrointestinal: Negative for nausea and vomiting  Musculoskeletal: Negative for gait problem and joint swelling  Skin: Negative for rash and wound  Neurological: Negative for weakness and numbness  Objective:      /80   Pulse 68   Temp (!) 96 7 °F (35 9 °C) (Skin)   Resp 18   Ht 6' (1 829 m)   Wt 81 2 kg (179 lb)   SpO2 96%   BMI 24 28 kg/m²          Physical Exam   Constitutional: He is oriented to person, place, and time  He appears well-nourished  No distress  Cardiovascular:   Nonpalpable PT pulses bilaterally, very faintly palpable DP pulses bilaterally  Skin temperature is noticeably cool to the touch  Pedal hair is absent bilaterally  Negative Salmon sign bilateral   Musculoskeletal: Normal range of motion  He exhibits no edema or tenderness  No pain with range of motion of joints  No pain on palpation of lower extremities bilaterally   Neurological: He is oriented to person, place, and time  Gross sensation intact bilaterally  Protective sensation intact bilaterally   Skin: No rash noted  No erythema  Cap refill was 3-4 seconds noted bilaterally    Skin temperature was cool with thin shiny pedal skin noted b/l

## 2019-08-15 ENCOUNTER — TRANSCRIBE ORDERS (OUTPATIENT)
Dept: VASCULAR SURGERY | Facility: CLINIC | Age: 64
End: 2019-08-15

## 2019-08-15 DIAGNOSIS — I73.9 PERIPHERAL VASCULAR DISEASE (HCC): Primary | ICD-10-CM

## 2019-08-28 NOTE — PROGRESS NOTES
Assessment/Plan:    Atherosclerosis of native artery of both lower extremities with intermittent claudication ([facility] [address] )  71 M CAD s/p CAB, DM, HTN, HLD, PAD who presents for evaluation intermittent claudication which is ongoing for about 10 years (since 2009 )    Patient complains of bilateral foot and calf tightness with activity  He can walk for up to 15 minutes when he develops calf pain  Both legs have symptoms which are about the same with the LEFT a bit worse than the RIGHT  However, the R foot gets numb when walking  Patient reports that the symptoms resolve quickly when he stops walking  He has no ischemic rest pain  He has a callous at the lateral aspect of the R foot at the 5th toe  Patient arrived 20 minutes late but was still seen  VAS LEAD 9/13/17   R 0 67/79/68; diffuse femoral and popliteal artery disease > 75 % prox/mid SFA  Tibioperoneal disease  L 0 59/54/50; diffuse femoral and popliteal artery disease; > 75% mSFA and 50-75% distal SFA/prox popliteal    Discussion:  We had a detailed discussion regarding patient's symptoms and management/ treatment of peripheral arterial disease  We reviewed his lower extremity arterial duplex from 2017 which showed severe disease at that time  I explained to him that he has severe arterial occlusive disease  He was supposed to have already had the SY performed but never scheduled so we will schedule the SY to be performed at next available before he leaves the office  Recommend regular walking program and optimal medical therapy  He should continue with aspirin and statin therapy  The feet are not threatened  He has no rest pain  There is a callous on the R foot  He is wearing sandals but with PAD and DM, he should have more protective shoes  We discussed reasons to perform intervention - severe pain on minimal exertion, lifestyle limiting pain, rest pain or wounds   He relates that pain is not lifestyle limiting - "only when I am walking "    -continue asa 81, atorvastatin and losartan  -LDL at goal < 70 one year ago; due for lipid panel profile; defer to PCP  -Regular, progressive walking program  -Check feet daily for wounds  -We discussed reasons to be seen sooner and/or perform interventions - lifestyle limiting claudication pain, rest pain or wounds  He relates that pain is not lifestyle limiting, but I am concerned about the severe PAD  -check lower extremity arterial duplex - already ordered by podiatry  -Follow up office visit in about 2 months with me or VS; we discussed reasons to be seen sooner  Controlled type 2 diabetes mellitus with diabetic neuropathy, without long-term current use of insulin ([facility] [address] )  Lab Results   Component Value Date    HGBA1C 5 7 10/16/2018     -continue with therapeutic lifestyle changes, medical therapy and monitoring per PCP      Mixed hyperlipidemia  - Tg 258 H28 L29 (201)  -Due for lipids; continue with statin     Benign essential hypertension  -as per PCP           Subjective:      Patient ID: Razia Reilly is a 71 y o  male  Patient is new to our practice referred by Sebastián Gandara  Patient complains of pain in legs  He has numbness in the L foot when walking and calf /foot pain L> R with walking at about 15 minutes of activity  He states that he walks about 3 x per week  Calf and foot pain resolves quickly after stopping exercise  He complains of R foot callous  Patient has had claudication since he had open heart surgery in 2009  He has a callous on his R foot x 3 years  Patient takes ASA and statin  HPI  71 M CAD s/p CAB, DM, HTN, HLD, PAD who is referred for consult and evaluation of intermittent claudication which is ongoing for about 10 years (since 2009 )    Patient complains of bilateral foot and calf tightness with activity  He can walk for up to 15 minutes when he develops calf pain  Both legs have symptoms which are about the same with the LEFT a bit worse than the RIGHT   However, the R foot gets numb when walking  Patient reports that the symptoms resolve quickly when he stops walking  He has no ischemic rest pain  He has a callous at the lateral aspect of the R foot at the 5th toe, otherwise no wounds  Patient arrived 20 minutes late but was still seen  He complains of the callous which he says was not treated when he went to the podiatrist  I explained that we will evaluate his arterial circulation by treatment for callous should be done by podiatry who he is due for follow up visit  In the meantime,   No chest pain or SOB  VAS LEAD 9/13/17  RIGHT LOWER LIMB:  Diffuse atherosclerotic disease throughout the femoral and popliteal arteries with >75% stenosis in the proximal/mid superficial femoral artery  There is tibioperoneal disease  Ankle/Brachial index:  0 67, moderate claudication range  PVR/ PPG tracings are dampened  Metatarsal pressure 79 mm Hg  Great toe pressure 68 mm Hg, within the healing range    LEFT LOWER LIMB:  There is diffuse atherosclerotic disease throughout the femoral and popliteal arteries with >75% stenosis in the mid superficial femoral artery and 50-75% stenosis in the distal superficial femoral/proximal popliteal artery  There is tibioperoneal disease  Ankle/Brachial index:  0 59, severe claudication range  PVR/ PPG tracings are dampened  Metatarsal pressure 54 mm Hg  Great toe pressure 50 mm Hg, within the healing range      The following portions of the patient's history were reviewed and updated as appropriate: allergies, current medications, past family history, past medical history, past social history, past surgical history and problem list     Review of Systems   Constitutional: Negative  HENT: Negative  Eyes: Negative  Respiratory: Negative  Cardiovascular:        Leg pain   Gastrointestinal: Negative  Endocrine: Negative  Musculoskeletal: Positive for gait problem  Skin: Negative  Allergic/Immunologic: Negative  Neurological: Positive for numbness (feet when walking)  Hematological: Negative  Psychiatric/Behavioral: Negative  Objective:    /72 (BP Location: Right arm, Patient Position: Sitting)   Pulse 68   Temp 98 3 °F (36 8 °C)   Resp 18   Ht 6' (1 829 m)   Wt 83 5 kg (184 lb)   BMI 24 95 kg/m²      Physical Exam   Constitutional: He is oriented to person, place, and time  He appears well-developed and well-nourished  He is cooperative  HENT:   Head: Normocephalic and atraumatic  Eyes: Pupils are equal, round, and reactive to light  EOM are normal    Neck: Trachea normal  Neck supple  No JVD present  No thyromegaly present  Cardiovascular: Normal rate, regular rhythm, S1 normal, S2 normal and normal heart sounds  Exam reveals no gallop and no friction rub  No murmur heard  Pulses:       Carotid pulses are 2+ on the right side, and 2+ on the left side  Radial pulses are 2+ on the right side, and 2+ on the left side  Femoral pulses are 2+ on the right side, and 2+ on the left side  Popliteal pulses are 0 on the right side, and 0 on the left side  Dorsalis pedis pulses are 0 on the right side, and 0 on the left side  Posterior tibial pulses are 0 on the right side, and 0 on the left side  + varicose veins    Small scars from vein removal B for CAB        Feet warm; no pulses in feet; 3 sec cap refill; no edema; R lateral / 5th toe callous  Monophasic R/L DP signals    Onychomycosis/ thigh toe nails; decreased hair on legs     Pulmonary/Chest: Effort normal and breath sounds normal  No accessory muscle usage  No respiratory distress  He has no wheezes  He has no rales  Abdominal: Soft  Bowel sounds are normal  He exhibits no distension  There is no hepatosplenomegaly  There is no tenderness  Musculoskeletal: Normal range of motion  He exhibits no edema or deformity  Neurological: He is alert and oriented to person, place, and time     Grossly normal    Skin: Skin is warm and dry  No lesion and no rash noted  No cyanosis  Nails show no clubbing  Psychiatric: He has a normal mood and affect  Nursing note and vitals reviewed  I have reviewed and made appropriate changes to the review of systems input by the medical assistant      Vitals:    08/29/19 0938   BP: 130/72   BP Location: Right arm   Patient Position: Sitting   Pulse: 68   Resp: 18   Temp: 98 3 °F (36 8 °C)   Weight: 83 5 kg (184 lb)   Height: 6' (1 829 m)       Patient Active Problem List   Diagnosis    Mixed hyperlipidemia    Benign essential hypertension    Controlled type 2 diabetes mellitus with diabetic neuropathy, without long-term current use of insulin (HCC)    Coronary artery disease    Severe carpal tunnel syndrome of right wrist    Atherosclerosis of native artery of both lower extremities with intermittent claudication (HCC)    Cervical spondylolysis    Elevated total protein    Incomplete emptying of bladder    Other constipation    Radicular pain    Seasonal allergies    Peripheral arterial disease (HCC)    Elevated bilirubin    Elevated alkaline phosphatase level       Past Surgical History:   Procedure Laterality Date    CARDIAC SURGERY      CORONARY ARTERY BYPASS GRAFT  2009       Family History   Problem Relation Age of Onset    Diabetes Mother     Heart disease Mother     Diabetes Father     Heart disease Father     Hypertension Brother     Mental illness Family         DISORDER       Social History     Socioeconomic History    Marital status: Single     Spouse name: Not on file    Number of children: Not on file    Years of education: Not on file    Highest education level: Not on file   Occupational History    Not on file   Social Needs    Financial resource strain: Not on file    Food insecurity:     Worry: Not on file     Inability: Not on file    Transportation needs:     Medical: Not on file     Non-medical: Not on file Tobacco Use    Smoking status: Never Smoker    Smokeless tobacco: Never Used   Substance and Sexual Activity    Alcohol use: No    Drug use: No    Sexual activity: Not on file   Lifestyle    Physical activity:     Days per week: Not on file     Minutes per session: Not on file    Stress: Not on file   Relationships    Social connections:     Talks on phone: Not on file     Gets together: Not on file     Attends Church service: Not on file     Active member of club or organization: Not on file     Attends meetings of clubs or organizations: Not on file     Relationship status: Not on file    Intimate partner violence:     Fear of current or ex partner: Not on file     Emotionally abused: Not on file     Physically abused: Not on file     Forced sexual activity: Not on file   Other Topics Concern    Not on file   Social History Narrative    Drinks coffee        INADEQUATE EXERCISE    LIVES WITH ADULT CHILDREN            No Known Allergies      Current Outpatient Medications:     acetaminophen (TYLENOL) 500 mg tablet, Take 2 tablets (1,000 mg total) by mouth every 6 (six) hours as needed for mild pain or moderate pain, Disp: 30 tablet, Rfl: 0    amLODIPine (NORVASC) 5 mg tablet, Take 1 tablet (5 mg total) by mouth daily, Disp: 30 tablet, Rfl: 2    aspirin 81 MG tablet, Take 1 tablet (81 mg total) by mouth daily, Disp: 90 tablet, Rfl: 0    atorvastatin (LIPITOR) 10 mg tablet, Take 1 tablet (10 mg total) by mouth daily, Disp: 30 tablet, Rfl: 2    Blood Pressure KIT, by Does not apply route, Disp: , Rfl:     Lancets (FREESTYLE) lancets, by Does not apply route daily, Disp: , Rfl:     losartan (COZAAR) 100 MG tablet, Take 1 tablet (100 mg total) by mouth daily, Disp: 30 tablet, Rfl: 2    methocarbamol (ROBAXIN) 500 mg tablet, Take 1 tablet (500 mg total) by mouth 2 (two) times a day, Disp: 20 tablet, Rfl: 0    metoprolol succinate (TOPROL-XL) 50 mg 24 hr tablet, Take 1 tablet (50 mg total) by mouth daily, Disp: 30 tablet, Rfl: 2    benzonatate (TESSALON PERLES) 100 mg capsule, Take 1 capsule (100 mg total) by mouth every 8 (eight) hours as needed for cough (Patient not taking: Reported on 8/29/2019), Disp: 30 capsule, Rfl: 0    Blood Glucose Monitoring Suppl (FREESTYLE FREEDOM LITE) w/Device KIT, by Does not apply route, Disp: , Rfl:     naproxen (NAPROSYN) 500 mg tablet, Take 1 tablet (500 mg total) by mouth 2 (two) times a day with meals (Patient not taking: Reported on 8/29/2019), Disp: 30 tablet, Rfl: 0

## 2019-08-28 NOTE — PATIENT INSTRUCTIONS
Peripheral arterial disease  -blocked arteries in the legs; claudication at 15 minutes x 10 years  -non-lifestyle limiting claudication  -Bilateral legs L foot goes numb; calf claudication  No hip or back pain  -Concerned about R foot callus which I deferred by to podiatry  Recommendations:  -We reviewed a duplex from 2 years ago which shows severe peripheral arterial disease in the claudication range  -Regular, progressive exercise program  -Continue with aspirin and cholesterol medicine  -Check feet daily for wounds  -We discussed that he should have more protective shoes rather than sandals  -We discussed reason to perform intervention - severe pain on minimal exertion, lifestyle limiting pain, rest pain or wounds  He relates that pain is not lifestyle limiting      -check lower extremity arterial duplex - already ordered by podiatry  -follow up office visit in 2 months; we discussed reasons why he should be seen sooner        Peripheral Artery Disease   AMBULATORY CARE:   Peripheral artery disease (PAD)  is narrow, weak, or blocked arteries  It may affect any arteries outside of your heart and brain  PAD is usually the result of a buildup of fat and cholesterol, also called plaque, along your artery walls  Inflammation, a blood clot, or abnormal cell growth could also block your arteries  PAD prevents normal blood flow to your legs and arms  You are at risk of an amputation if poor blood flow keeps wounds from healing or causes gangrene (tissue death)  Without treatment, PAD can also cause a heart attack or stroke  Common symptoms include:  Mild PAD usually does not cause symptoms   As the disease worsens over time, you may have the following:  · Pain or cramps in your leg or hip while you walk     · A numb, weak, or heavy feeling in your legs     · Dry, scaly, red, or pale skin on your legs     · Thick or brittle nails, or hair loss on your arms and legs     · Foot sores that will not heal     · Burning or aching in your feet and toes while resting (this may be worse when you lie down)  Call 911 for the following:   · You have any of the following signs of a heart attack:      ¨ Squeezing, pressure, or pain in your chest that lasts longer than 5 minutes or returns    ¨ Discomfort or pain in your back, neck, jaw, stomach, or arm     ¨ Trouble breathing    ¨ Nausea or vomiting    ¨ Lightheadedness or a sudden cold sweat, especially with chest pain or trouble breathing    · You have any of the following signs of a stroke:      ¨ Numbness or drooping on one side of your face     ¨ Weakness in an arm or leg    ¨ Confusion or difficulty speaking    ¨ Dizziness, a severe headache, or vision loss  Seek care immediately if:   · You have sores or wounds that will not heal      · You notice black or discolored skin on your arm or leg  · Your skin is cool to the touch  Contact your healthcare provider if:   · You have leg pain when you walk 1/8 mile (200 meters) or less, even with treatment  · Your legs are red, dry, or pale, even with treatment  · You have questions or concerns about your condition or care  Treatment for PAD  can help reduce your risk of a heart attack, stroke, or amputation  You may need more than one of the following:  · Medicines  may be given to prevent blood clots and reduce the risk of a heart attack or stroke  You may be given medicine to help prevent your PAD from getting worse  · A supervised exercise program  helps you stay active in normal daily activities and may prevent disability  Healthcare providers will help you safely walk or do strength training exercises 3 times a week for 30 to 60 minutes  You will do this for several months, then transition to walking on your own  · Angioplasty  is a procedure to open your artery so blood can flow through normally  A thin tube called a catheter is used to insert a small balloon into your artery   The balloon is inflated to open your blocked artery, and then removed  A tube called a stent may be placed in your artery to hold it open  · Bypass surgery  is used to make a new connection to your artery with a vein from another part of your body, or an artificial graft  The vein or graft is attached to your artery above and below your blockage  This allows blood to flow around the blocked portion of your artery  Manage and prevent PAD:   · Walk for 30 to 60 minutes at least 4 times a week  Your healthcare provider may also refer you to an supervised exercise program  The program helps increase how far you can walk without pain  It also helps you stay active in normal daily activities and may prevent disability caused by PAD  · Do not smoke  Nicotine and other chemicals in cigarettes and cigars can worsen PAD  They can also increase your risk for a heart attack or stroke  Ask your healthcare provider for information if you currently smoke and need help to quit  E-cigarettes or smokeless tobacco still contain nicotine  Talk to your healthcare provider before you use these products  · Manage other health conditions  Take your medicines as directed and follow your healthcare provider's instructions if you have high blood pressure or high cholesterol  Perform foot care and check your blood sugar levels as directed if you have diabetes  · Eat heart healthy foods  Eat whole grains, fruits, and vegetables every day  Limit salt and high-fat foods  Ask your healthcare provider for more information on a heart healthy diet  Ask if you need to lose weight  Your healthcare provider can help you create a healthy weight-loss plan  Follow up with your healthcare provider as directed:  Write down your questions so you remember to ask them during your visits  © 2017 [employer] Information is for End User's use only and may not be sold, redistributed or otherwise used for commercial purposes   All illustrations and images included in [facility] are the copyrighted property of [employer]  or Ar[employer]  The above information is an  only  It is not intended as medical advice for individual conditions or treatments  Talk to your doctor, nurse or pharmacist before following any medical regimen to see if it is safe and effective for you

## 2019-08-28 NOTE — ASSESSMENT & PLAN NOTE
Lab Results   Component Value Date    HGBA1C 5 7 10/16/2018     -continue with therapeutic lifestyle changes, medical therapy and monitoring per PCP

## 2019-08-28 NOTE — ASSESSMENT & PLAN NOTE
71 M CAD s/p CAB, DM, HTN, HLD, PAD who presents for evaluation intermittent claudication which is ongoing for about 10 years (since 2009 )    Patient complains of bilateral foot and calf tightness with activity  He can walk for up to 15 minutes when he develops calf pain  Both legs have symptoms which are about the same with the LEFT a bit worse than the RIGHT  However, the R foot gets numb when walking  Patient reports that the symptoms resolve quickly when he stops walking  He has no ischemic rest pain  He has a callous at the lateral aspect of the R foot at the 5th toe  Patient arrived 20 minutes late but was still seen  VAS LEAD 9/13/17   R 0 67/79/68; diffuse femoral and popliteal artery disease > 75 % prox/mid SFA  Tibioperoneal disease  L 0 59/54/50; diffuse femoral and popliteal artery disease; > 75% mSFA and 50-75% distal SFA/prox popliteal    Discussion:  We had a detailed discussion regarding patient's symptoms and management/ treatment of peripheral arterial disease  We reviewed his lower extremity arterial duplex from 2017 which showed severe disease at that time  I explained to him that he has severe arterial occlusive disease  He was supposed to have already had the SY performed but never scheduled so we will schedule the SY to be performed at next available before he leaves the office  Recommend regular walking program and optimal medical therapy  He should continue with aspirin and statin therapy  The feet are not threatened  He has no rest pain  There is a callous on the R foot  He is wearing sandals but with PAD and DM, he should have more protective shoes  We discussed reasons to perform intervention - severe pain on minimal exertion, lifestyle limiting pain, rest pain or wounds   He relates that pain is not lifestyle limiting - "only when I am walking "    -continue asa 81, atorvastatin and losartan  -LDL at goal < 70 one year ago; due for lipid panel profile; defer to PCP  -Regular, progressive walking program  -Check feet daily for wounds  -We discussed reasons to be seen sooner and/or perform interventions - lifestyle limiting claudication pain, rest pain or wounds  He relates that pain is not lifestyle limiting, but I am concerned about the severe PAD  -check lower extremity arterial duplex - already ordered by podiatry  -Follow up office visit in about 2 months with me or VS; we discussed reasons to be seen sooner

## 2019-08-29 ENCOUNTER — CONSULT (OUTPATIENT)
Dept: VASCULAR SURGERY | Facility: CLINIC | Age: 64
End: 2019-08-29
Payer: MEDICARE

## 2019-08-29 VITALS
SYSTOLIC BLOOD PRESSURE: 130 MMHG | RESPIRATION RATE: 18 BRPM | WEIGHT: 184 LBS | DIASTOLIC BLOOD PRESSURE: 72 MMHG | BODY MASS INDEX: 24.92 KG/M2 | TEMPERATURE: 98.3 F | HEART RATE: 68 BPM | HEIGHT: 72 IN

## 2019-08-29 DIAGNOSIS — I70.213 ATHEROSCLEROSIS OF NATIVE ARTERY OF BOTH LOWER EXTREMITIES WITH INTERMITTENT CLAUDICATION (HCC): Primary | ICD-10-CM

## 2019-08-29 DIAGNOSIS — E11.40 CONTROLLED TYPE 2 DIABETES MELLITUS WITH DIABETIC NEUROPATHY, WITHOUT LONG-TERM CURRENT USE OF INSULIN (HCC): ICD-10-CM

## 2019-08-29 DIAGNOSIS — E78.2 MIXED HYPERLIPIDEMIA: ICD-10-CM

## 2019-08-29 DIAGNOSIS — I10 BENIGN ESSENTIAL HYPERTENSION: ICD-10-CM

## 2019-08-29 DIAGNOSIS — I73.9 PERIPHERAL VASCULAR DISEASE (HCC): ICD-10-CM

## 2019-08-29 PROCEDURE — 99214 OFFICE O/P EST MOD 30 MIN: CPT | Performed by: PHYSICIAN ASSISTANT

## 2019-08-29 NOTE — LETTER
August 29, 2019     Bonnie Gonzáles DO  [address] [facility] [address]    Patient: Chip Michelle   YOB: 1955   Date of Visit: 8/29/2019     Dear Dr Debra Hairston      Thank you for referring Chip Michelle to me for evaluation  Below are the relevant portions of my assessment and plan of care  If you have questions, please do not hesitate to call me  I look forward to following Denny along with you  Sincerely,        Ruth Handley PA-C        CC: IESHA Draper DPM    Progress Notes:      Assessment/Plan:    Atherosclerosis of native artery of both lower extremities with intermittent claudication ([facility] [address] )  71 M CAD s/p CAB, DM, HTN, HLD, PAD who presents for evaluation intermittent claudication which is ongoing for about 10 years (since 2009 )    Patient complains of bilateral foot and calf tightness with activity  He can walk for up to 15 minutes when he develops calf pain  Both legs have symptoms which are about the same with the LEFT a bit worse than the RIGHT  However, the R foot gets numb when walking  Patient reports that the symptoms resolve quickly when he stops walking  He has no ischemic rest pain  He has a callous at the lateral aspect of the R foot at the 5th toe  Patient arrived 20 minutes late but was still seen  VAS LEAD 9/13/17   R 0 67/79/68; diffuse femoral and popliteal artery disease > 75 % prox/mid SFA  Tibioperoneal disease  L 0 59/54/50; diffuse femoral and popliteal artery disease; > 75% mSFA and 50-75% distal SFA/prox popliteal    Discussion:  We had a detailed discussion regarding patient's symptoms and management/ treatment of peripheral arterial disease  We reviewed his lower extremity arterial duplex from 2017 which showed severe disease at that time  I explained to him that he has severe arterial occlusive disease   He was supposed to have already had the SY performed but never scheduled so we will schedule the SY to be performed at next available before he leaves the office  Recommend regular walking program and optimal medical therapy  He should continue with aspirin and statin therapy  The feet are not threatened  He has no rest pain  There is a callous on the R foot  He is wearing sandals but with PAD and DM, he should have more protective shoes  We discussed reasons to perform intervention - severe pain on minimal exertion, lifestyle limiting pain, rest pain or wounds  He relates that pain is not lifestyle limiting - "only when I am walking "    -continue asa 81, atorvastatin and losartan  -LDL at goal < 70 one year ago; due for lipid panel profile; defer to PCP  -Regular, progressive walking program  -Check feet daily for wounds  -We discussed reasons to be seen sooner and/or perform interventions - lifestyle limiting claudication pain, rest pain or wounds  He relates that pain is not lifestyle limiting, but I am concerned about the severe PAD  -check lower extremity arterial duplex - already ordered by podiatry  -Follow up office visit in about 2 months with me or VS; we discussed reasons to be seen sooner       Controlled type 2 diabetes mellitus with diabetic neuropathy, without long-term current use of insulin ([facility])  Lab Results   Component Value Date    HGBA1C 5 7 10/16/2018     -continue with therapeutic lifestyle changes, medical therapy and monitoring per PCP      Mixed hyperlipidemia  - Tg 258 H28 L29 (201)  -Due for lipids; continue with statin     Benign essential hypertension  -as per PCP

## 2019-09-12 ENCOUNTER — HOSPITAL ENCOUNTER (OUTPATIENT)
Dept: NON INVASIVE DIAGNOSTICS | Facility: CLINIC | Age: 64
Discharge: HOME | End: 2019-09-12
Payer: MEDICARE

## 2019-09-12 DIAGNOSIS — I73.9 CLAUDICATION OF BOTH LOWER EXTREMITIES (HCC): ICD-10-CM

## 2019-09-12 PROCEDURE — 93925 LOWER EXTREMITY STUDY: CPT

## 2019-09-12 PROCEDURE — 93923 UPR/LXTR ART STDY 3+ LVLS: CPT

## 2019-09-14 PROCEDURE — 93922 UPR/L XTREMITY ART 2 LEVELS: CPT | Performed by: SURGERY

## 2019-09-14 PROCEDURE — 93925 LOWER EXTREMITY STUDY: CPT | Performed by: SURGERY

## 2019-09-23 PROBLEM — I25.118 CORONARY ARTERY DISEASE OF NATIVE ARTERY OF NATIVE HEART WITH STABLE ANGINA PECTORIS (HCC): Status: ACTIVE | Noted: 2017-05-04

## 2019-10-28 NOTE — PROGRESS NOTES
Assessment/Plan:    No problem-specific Assessment & Plan notes found for this encounter  {Assess/PlanSmarinks:84536}      Subjective:      Patient ID: Mick Rossi is a 71 y o  male  Pt is here for the results of his SY on 9/12/19  Pt was last seen on 8/29/19 for Atherosclerosis of Native Artery of both Lower Extremities  Pt is currently taking ASA and Lipitor  HPI    {Common ambulatory SmartLinks:21501}    Review of Systems      Objective: There were no vitals taken for this visit           Physical Exam

## 2019-10-30 ENCOUNTER — OFFICE VISIT (OUTPATIENT)
Dept: VASCULAR SURGERY | Facility: CLINIC | Age: 64
End: 2019-10-30
Payer: MEDICARE

## 2019-10-30 VITALS
RESPIRATION RATE: 18 BRPM | HEART RATE: 60 BPM | SYSTOLIC BLOOD PRESSURE: 130 MMHG | HEIGHT: 72 IN | BODY MASS INDEX: 25.33 KG/M2 | DIASTOLIC BLOOD PRESSURE: 80 MMHG | WEIGHT: 187 LBS

## 2019-10-30 DIAGNOSIS — I70.213 ATHEROSCLEROSIS OF NATIVE ARTERY OF BOTH LOWER EXTREMITIES WITH INTERMITTENT CLAUDICATION (HCC): Primary | ICD-10-CM

## 2019-10-30 DIAGNOSIS — M79.672 FOOT PAIN, LEFT: ICD-10-CM

## 2019-10-30 DIAGNOSIS — I73.9 PERIPHERAL ARTERIAL DISEASE (HCC): ICD-10-CM

## 2019-10-30 PROCEDURE — 99213 OFFICE O/P EST LOW 20 MIN: CPT | Performed by: SURGERY

## 2019-10-30 NOTE — PROGRESS NOTES
Assessment/Plan:    Pt is a 72 yo M w/ DM, HTN, CAD, carpal tunnel, cervical spine disease,  HLD, PAD, presents to review testing    Atherosclerosis of native artery of both lower extremities with intermittent claudication (HCC)  Peripheral arterial disease (HCC)  -     VAS lower limb arterial duplex, complete bilateral; Future  -     VAS abdominal aorta/iliacs; complete study; Future  -reviewed LEADs which show R: 0 71/79/78 and L: 0 58/54/44 with B mid SFA stenosis, R profunda and tibial disease, L proximal popliteal stenosis  -despite significant disease, patient is not having significant symptoms and has no wounds; he is currently walking 2km without issue and I have encouraged him to continue doing this; discussed reasons to return including cramping, pain, or wounds  -will repeat LEADs in 1 year as well as AOIL as I suspect iliac disease given nonpalp L fem pulse    Foot pain, left  -     Ambulatory referral to Podiatry; Future  -patient interested in seeing podiatry for nail clipping and I also notice diabetic foot changes and also for general care to prevent wounds    Medications  -cont ASA and statin for life    Subjective:      Patient ID: Marce Belcher is a 71 y o  male  Patient had a SY on 9/12/19  Pt states that his legs are better and is walking more and has less pain  Pt can continue to walk through the pain  Pt denies any rest pain or when elevating his legs  Pt continues to take ASA 81 mg and Atorvastatin  HPI:    Patient presents to review testing  Last seen by Tiffany    Patient notes that prior to this appt, he was having significant left leg pain at rest and with walking  Since visit with Tiffany, this pain has nearly resolved  He still has mild pain in the foot  Denies any cramping in the calf or thigh  He is able to walk 2 km without issue  He thinks that coming to his appointments and ish in his doctors has helped the pain go away    He has not changed any medications or activity or habits  Denies any ulcerations/wounds  Despite using Faroese  for entire interview and appt, was somewhat difficult to take history  Patient kept talking over me and the   He takes ASA/statin  Denies current smoking  The following portions of the patient's history were reviewed and updated as appropriate: allergies, current medications, past family history, past medical history, past social history, past surgical history and problem list     Review of Systems   Constitutional: Negative  HENT: Negative  Eyes: Negative  Respiratory: Negative  Cardiovascular: Negative  Gastrointestinal: Negative  Endocrine: Negative  Genitourinary: Negative  Musculoskeletal: Positive for arthralgias and myalgias  Negative for gait problem  L foot pain   Skin: Negative  Negative for wound  Allergic/Immunologic: Negative  Neurological: Negative  Negative for weakness and numbness  Hematological: Negative  Psychiatric/Behavioral: Negative  Objective:      /80 (BP Location: Right arm, Patient Position: Sitting)   Pulse 60   Resp 18   Ht 6' (1 829 m)   Wt 84 8 kg (187 lb)   BMI 25 36 kg/m²          Physical Exam   Constitutional: He is oriented to person, place, and time  He appears well-developed and well-nourished  HENT:   Head: Normocephalic and atraumatic  Eyes: Conjunctivae are normal    Neck: Normal range of motion  Neck supple  Cardiovascular: Normal rate, regular rhythm and normal heart sounds  No murmur heard  Pulses:       Radial pulses are 2+ on the right side, and 2+ on the left side  Femoral pulses are 2+ on the right side, and 0 on the left side  Popliteal pulses are 0 on the left side  Dorsalis pedis pulses are 2+ on the right side, and 0 on the left side  Posterior tibial pulses are 0 on the left side     No carotid bruits B   Pulmonary/Chest: Effort normal and breath sounds normal    Abdominal: Soft  He exhibits no distension  There is no tenderness  There is no rebound  Musculoskeletal: Normal range of motion  He exhibits no edema  Neurological: He is alert and oriented to person, place, and time  Skin: Skin is warm and dry  toenails are long and darkened; no foot wounds   Psychiatric: He has a normal mood and affect  His behavior is normal    Nursing note and vitals reviewed  I have reviewed and made appropriate changes to the review of systems input by the medical assistant      Vitals:    10/30/19 1117   BP: 130/80   BP Location: Right arm   Patient Position: Sitting   Pulse: 60   Resp: 18   Weight: 84 8 kg (187 lb)   Height: 6' (1 829 m)       Patient Active Problem List   Diagnosis    Mixed hyperlipidemia    Benign essential hypertension    Controlled type 2 diabetes mellitus with diabetic neuropathy, without long-term current use of insulin (HCC)    Coronary artery disease of native artery of native heart with stable angina pectoris (HCC)    Severe carpal tunnel syndrome of right wrist    Atherosclerosis of native artery of both lower extremities with intermittent claudication (HCC)    Cervical spondylolysis    Elevated total protein    Incomplete emptying of bladder    Other constipation    Radicular pain    Seasonal allergies    Peripheral arterial disease (HCC)    Elevated bilirubin    Elevated alkaline phosphatase level    Foot pain, left       Past Surgical History:   Procedure Laterality Date    CARDIAC SURGERY      CORONARY ARTERY BYPASS GRAFT  2009       Family History   Problem Relation Age of Onset    Diabetes Mother     Heart disease Mother     Diabetes Father     Heart disease Father     Hypertension Brother     Mental illness Family         DISORDER       Social History     Socioeconomic History    Marital status: Single     Spouse name: Not on file    Number of children: Not on file    Years of education: Not on file  Highest education level: Not on file   Occupational History    Not on file   Social Needs    Financial resource strain: Not on file    Food insecurity:     Worry: Not on file     Inability: Not on file    Transportation needs:     Medical: Not on file     Non-medical: Not on file   Tobacco Use    Smoking status: Never Smoker    Smokeless tobacco: Never Used   Substance and Sexual Activity    Alcohol use: No    Drug use: No    Sexual activity: Not on file   Lifestyle    Physical activity:     Days per week: Not on file     Minutes per session: Not on file    Stress: Not on file   Relationships    Social connections:     Talks on phone: Not on file     Gets together: Not on file     Attends [employer] service: Not on file     Active member of club or organization: Not on file     Attends meetings of clubs or organizations: Not on file     Relationship status: Not on file    Intimate partner violence:     Fear of current or ex partner: Not on file     Emotionally abused: Not on file     Physically abused: Not on file     Forced sexual activity: Not on file   Other Topics Concern    Not on file   Social History Narrative    Drinks coffee        INADEQUATE EXERCISE    LIVES WITH ADULT CHILDREN            No Known Allergies      Current Outpatient Medications:     acetaminophen (TYLENOL) 500 mg tablet, Take 2 tablets (1,000 mg total) by mouth every 6 (six) hours as needed for mild pain or moderate pain, Disp: 30 tablet, Rfl: 0    amLODIPine (NORVASC) 5 mg tablet, Take 1 tablet (5 mg total) by mouth daily, Disp: 30 tablet, Rfl: 2    aspirin 81 MG tablet, Take 1 tablet (81 mg total) by mouth daily, Disp: 90 tablet, Rfl: 0    atorvastatin (LIPITOR) 10 mg tablet, Take 1 tablet (10 mg total) by mouth daily, Disp: 30 tablet, Rfl: 2    benzonatate (TESSALON PERLES) 100 mg capsule, Take 1 capsule (100 mg total) by mouth every 8 (eight) hours as needed for cough, Disp: 30 capsule, Rfl: 0    Blood Glucose Monitoring Suppl (FREESTYLE FREEDOM LITE) w/Device KIT, by Does not apply route, Disp: , Rfl:     Blood Pressure KIT, by Does not apply route, Disp: , Rfl:     Lancets (FREESTYLE) lancets, by Does not apply route daily, Disp: , Rfl:     losartan (COZAAR) 100 MG tablet, Take 1 tablet (100 mg total) by mouth daily, Disp: 30 tablet, Rfl: 2    methocarbamol (ROBAXIN) 500 mg tablet, Take 1 tablet (500 mg total) by mouth 2 (two) times a day, Disp: 20 tablet, Rfl: 0    metoprolol succinate (TOPROL-XL) 50 mg 24 hr tablet, Take 1 tablet (50 mg total) by mouth daily, Disp: 30 tablet, Rfl: 2    naproxen (NAPROSYN) 500 mg tablet, Take 1 tablet (500 mg total) by mouth 2 (two) times a day with meals, Disp: 30 tablet, Rfl: 0

## 2019-10-30 NOTE — PATIENT INSTRUCTIONS
1) PAD  -your test shows blockages in the arteries  -unless you have having pain in the legs that limits your walking or wounds that won't heal, we do not need to do any surgery at this trang  -we will continue to monitor you with ultrasounds  -I have referred you to a podiatrist for foot care    Enfermedad arterial periférica   CUIDADO AMBULATORIO:   La enfermedad arterial periférica (EAP)  se refiere a las arterias estrechas, débiles u obstruidas  Puede afectar cualquier arteria fuera del corazón y el cerebro  La enfermedad arterial periférica generalmente es el resultado de la acumulación de rafaela Otto, (también se le denomina placa), alrededor de las lafleur de la arteria  Sanjana inflamación, un coágulo de gerald o un crecimiento anormal de las células puede también obstruir las arterias  La enfermedad arterial periférica impide el flujo normal de gerald a ivet piernas y brazos  Usted corre el riesgo de sanjana amputación si la falta de circulación de la gerald impide que las heridas cicatricen o produce grangrena (muerte del tejido)  Sin tratamiento, la enfermedad arterial periférica también puede causar un ataque cardíaco o un derrame cerebral   Los síntomas comunes incluyen:  La enfermedad arterial periférica leve por lo general no causa síntomas   A medida que el tiempo pasa y la enfermedad RENETTA usted puede tener cualquiera de lo siguiente:  · Dolor o calambres en las piernas o en las caderas mientras usted camina     · Sanjana sensación de entumecimiento, debilidad o pesadez en ivet piernas     · La piel de ivet piernas está seca, escamosa, enrojecida o pálida     · Uñas gruesas o quebradizas o la caída del vello en ivet brazos y piernas     · Llagas en los pies que no cicatrizan     · Sensación quemante o dolorosa en ivet pies o dedos de los pies mientras está en reposo (se puede empeorar al acostarse)  Llame al 911 en dave de presentar lo siguiente:   · Usted tiene alguno de los siguientes signos de un ataque cardíaco:      ¨ Estornudos, presión, o dolor en rome pecho que dura mas de 5 minutos o regresa  ¨ Malestar o dolor en rome espalda, justin, mandíbula, abdomen, o brazo     ¨ Dificultad para respirar    ¨ Náuseas o vómito    ¨ Siente un desvanecimiento o tiene sudores fríos especialmente en el pecho o dificultad para respirar  · Usted tiene alguno de los siguientes signos de derrame cerebral:      ¨ Adormecimiento o caída de un lado de rome juan     ¨ Debilidad en un brazo o sanjana pierna    ¨ Confusión o debilidad para hablar    ¨ Mareos o dolor de joseph intenso, o pérdida de la visión  Busque atención médica de inmediato si:   · Usted tiene llagas o heridas que no cicatrizan  · Usted nota que la piel en lydia brazos o piernas tiene sanjana tonalidad conchita o está descolorida  · [place] piel se siente fría al tacto  Pregúntele a rome Lurene Ruder vitaminas y minerales son adecuados para usted  · Usted tiene [employer] piernas cuando camina 1/8 jessica (200 metros) o menos, incluso con [place]  · Lydia piernas están enrojecidas, resecas o pálidas, incluso con el tratamiento  · Usted tiene preguntas o inquietudes acerca de rome condición o cuidado  El tratamiento para la enfermedad arterial periférica  puede ayudar a reducir el riesgo de un ataque cardíaco, un accidente cerebrovascular o sanjana amputación  Usted podría necesitar más de rory de los siguientes:  · Medicamentos,  que se administran para prevenir coágulos de gerald y reducir el riesgo de un ataque cardíaco o accidente cerebrovascular  Es posible que le administren medicamento para evitar que empeore la enfermedad arterial periférica  · Un programa de ejercicio supervisado  lo ayuda a mantenerse [employer] de la jacob cotidiana y podría prevenir la discapacidad  Los médicos le van ayudar a caminar o realizar ejercicios de resistencia en un entorno seguro 3 veces a la semana lacey 30 a 60 minutos   Usted va hacer esto por varios meses, luego hace la transición de caminar por sí mismo  · Angioplastia  es un procedimiento para abrir rome arteria para que la gerald pueda circular normalmente  Un catéter, es sanjana sonda diminuta, que se [place] para introducir un globo en rome arteria  El globo se infla para abrir rome arteria bloqueada y luego se procede a extraerlo  Un tubo de terence metálica que se conoce vashti stent se coloca en el interior de la arteria para mantenerla abierta  · Cirugía de bypass o derivación  se utiliza para crear sanjana nueva conexión a rome arteria con sanjana vena de otra parte de rome cuerpo, o un injerto artificial  La vena o el injerto se conecta a rome arteria por encima o por debajo de la obstrucción  Lo cual permite que la gerald fluya alrededor del tramo de la arteria bloqueada  Controle y evite la enfermedad arterial periférica:   · El control de la enfermedad arterial periférica:Camine de 30 a 60 minutos por lo menos 4 veces a la semana  [place] médico también podría remitirlo a un programa de ejercicio supervisado  El programa ayuda a aumentar la distancia que usted puede caminar sin sentir dolor  El programa lo ayuda a mantenerse [employer] actividades de la jacob cotidiana y podría prevenir la discapacidad provocada por la enfermedad arterial periférica  · No fume  La nicotina y otros químicos en los cigarrillos y cigarros pueden empeorar la enfermedad arterial periférica  El tabaquismo también aumenta el riesgo de un ataque cardíaco o accidente cerebrovascular  Pida información a rome médico si usted actualmente fuma y necesita ayuda para dejar de fumar  Los cigarrillos electrónicos o tabaco sin humo todavía contienen nicotina  Consulte con rome médico antes de [employer]  · Controle otras afecciones de [place] ivet medicamentos según lo indicado  Siga las instrucciones de rome médico si usted sufre de hipertensión o colesterol alto   Cuide de ivet pies y revise ivet niveles de azúcar en la gerald según indicaciones y si tiene diabetes  · Consuma alimentos saludables para rome corazón  Consuma granos enteros, frutas y vegetales diariamente  Limite la sal y los alimentos altos en grasas  Consulte a rome médico si desea obtener más información acerca de sanjana dieta saludable para el corazón  Pregunte si es necesario que baje de [place]  [place] médico puede ayudarle a crear un plan para bajar de peso de manera saludable  Acuda a ivet consultas de control con rome médico según le indicaron  Anote ivet preguntas para que se acuerde de hacerlas lacey ivet visitas  © 2017 [address] Information is for End User's use only and may not be sold, redistributed or otherwise used for commercial purposes  All illustrations and images included in CareNotes® are the copyrighted property of A D A M , Inc  or Archie Fowler  Esta información es sólo para uso en educación  [place] intención no es darle un consejo médico sobre enfermedades o tratamientos  Colsulte con rome [place] farmacéutico antes de seguir cualquier régimen médico para saber si es seguro y efectivo para usted

## 2020-03-30 ENCOUNTER — APPOINTMENT (EMERGENCY)
Dept: RADIOLOGY | Facility: HOSPITAL | Age: 65
End: 2020-03-30
Payer: MEDICARE

## 2020-03-30 ENCOUNTER — HOSPITAL ENCOUNTER (EMERGENCY)
Facility: HOSPITAL | Age: 65
End: 2020-03-31
Attending: EMERGENCY MEDICINE | Admitting: EMERGENCY MEDICINE
Payer: MEDICARE

## 2020-03-30 DIAGNOSIS — R77.8 ELEVATED TROPONIN: Primary | ICD-10-CM

## 2020-03-30 DIAGNOSIS — R07.9 CHEST PAIN: ICD-10-CM

## 2020-03-30 LAB
ANION GAP SERPL CALCULATED.3IONS-SCNC: 7 MMOL/L (ref 5–14)
APTT PPP: 32 SECONDS (ref 23–37)
BUN SERPL-MCNC: 10 MG/DL (ref 5–25)
CALCIUM SERPL-MCNC: 8.6 MG/DL (ref 8.4–10.2)
CHLORIDE SERPL-SCNC: 105 MMOL/L (ref 97–108)
CO2 SERPL-SCNC: 24 MMOL/L (ref 22–30)
CREAT SERPL-MCNC: 0.89 MG/DL (ref 0.7–1.5)
ERYTHROCYTE [DISTWIDTH] IN BLOOD BY AUTOMATED COUNT: 12.5 %
GFR SERPL CREATININE-BSD FRML MDRD: 90 ML/MIN/1.73SQ M
GLUCOSE SERPL-MCNC: 157 MG/DL (ref 70–99)
HCT VFR BLD AUTO: 39.8 % (ref 41–53)
HGB BLD-MCNC: 14.2 G/DL (ref 13.5–17.5)
INR PPP: 1.2 (ref 0.84–1.19)
LYMPHOCYTES # BLD AUTO: 0.49 THOUSAND/UL (ref 0.5–4)
LYMPHOCYTES # BLD AUTO: 10 % (ref 25–45)
MCH RBC QN AUTO: 33.3 PG (ref 26–34)
MCHC RBC AUTO-ENTMCNC: 35.6 G/DL (ref 31–36)
MCV RBC AUTO: 94 FL (ref 80–100)
MONOCYTES # BLD AUTO: 0.83 THOUSAND/UL (ref 0.2–0.9)
MONOCYTES NFR BLD AUTO: 17 % (ref 1–10)
NEUTS SEG # BLD: 3.58 THOUSAND/UL (ref 1.8–7.8)
NEUTS SEG NFR BLD AUTO: 73 %
NT-PROBNP SERPL-MCNC: 225 PG/ML (ref 0–299)
PLATELET # BLD AUTO: 104 THOUSANDS/UL (ref 150–450)
PLATELET BLD QL SMEAR: ABNORMAL
PMV BLD AUTO: 11.8 FL (ref 8.9–12.7)
POTASSIUM SERPL-SCNC: 4 MMOL/L (ref 3.6–5)
PROTHROMBIN TIME: 14.6 SECONDS (ref 11.6–14.5)
RBC # BLD AUTO: 4.25 MILLION/UL (ref 4.5–5.9)
RBC MORPH BLD: NORMAL
SODIUM SERPL-SCNC: 136 MMOL/L (ref 137–147)
TOTAL CELLS COUNTED SPEC: 100
TROPONIN I SERPL-MCNC: 1.04 NG/ML (ref 0–0.03)
TROPONIN I SERPL-MCNC: 2.85 NG/ML (ref 0–0.03)
WBC # BLD AUTO: 4.9 THOUSAND/UL (ref 4.5–11)

## 2020-03-30 PROCEDURE — 93005 ELECTROCARDIOGRAM TRACING: CPT

## 2020-03-30 PROCEDURE — 96375 TX/PRO/DX INJ NEW DRUG ADDON: CPT

## 2020-03-30 PROCEDURE — 36415 COLL VENOUS BLD VENIPUNCTURE: CPT | Performed by: EMERGENCY MEDICINE

## 2020-03-30 PROCEDURE — 71045 X-RAY EXAM CHEST 1 VIEW: CPT

## 2020-03-30 PROCEDURE — 99285 EMERGENCY DEPT VISIT HI MDM: CPT | Performed by: EMERGENCY MEDICINE

## 2020-03-30 PROCEDURE — 84484 ASSAY OF TROPONIN QUANT: CPT | Performed by: EMERGENCY MEDICINE

## 2020-03-30 PROCEDURE — 96366 THER/PROPH/DIAG IV INF ADDON: CPT

## 2020-03-30 PROCEDURE — 85730 THROMBOPLASTIN TIME PARTIAL: CPT | Performed by: EMERGENCY MEDICINE

## 2020-03-30 PROCEDURE — 96365 THER/PROPH/DIAG IV INF INIT: CPT

## 2020-03-30 PROCEDURE — 85610 PROTHROMBIN TIME: CPT | Performed by: EMERGENCY MEDICINE

## 2020-03-30 PROCEDURE — 80048 BASIC METABOLIC PNL TOTAL CA: CPT | Performed by: EMERGENCY MEDICINE

## 2020-03-30 PROCEDURE — 85007 BL SMEAR W/DIFF WBC COUNT: CPT | Performed by: EMERGENCY MEDICINE

## 2020-03-30 PROCEDURE — 83880 ASSAY OF NATRIURETIC PEPTIDE: CPT | Performed by: EMERGENCY MEDICINE

## 2020-03-30 PROCEDURE — 99285 EMERGENCY DEPT VISIT HI MDM: CPT

## 2020-03-30 PROCEDURE — 85027 COMPLETE CBC AUTOMATED: CPT | Performed by: EMERGENCY MEDICINE

## 2020-03-30 RX ORDER — HEPARIN SODIUM 1000 [USP'U]/ML
4000 INJECTION, SOLUTION INTRAVENOUS; SUBCUTANEOUS ONCE
Status: COMPLETED | OUTPATIENT
Start: 2020-03-30 | End: 2020-03-30

## 2020-03-30 RX ORDER — ASPIRIN 81 MG/1
324 TABLET, CHEWABLE ORAL ONCE
Status: COMPLETED | OUTPATIENT
Start: 2020-03-30 | End: 2020-03-30

## 2020-03-30 RX ORDER — HEPARIN SODIUM 1000 [USP'U]/ML
4000 INJECTION, SOLUTION INTRAVENOUS; SUBCUTANEOUS
Status: DISCONTINUED | OUTPATIENT
Start: 2020-03-30 | End: 2020-03-31 | Stop reason: HOSPADM

## 2020-03-30 RX ORDER — HEPARIN SODIUM 10000 [USP'U]/100ML
3-20 INJECTION, SOLUTION INTRAVENOUS
Status: DISCONTINUED | OUTPATIENT
Start: 2020-03-30 | End: 2020-03-31 | Stop reason: HOSPADM

## 2020-03-30 RX ORDER — HEPARIN SODIUM 1000 [USP'U]/ML
2000 INJECTION, SOLUTION INTRAVENOUS; SUBCUTANEOUS
Status: DISCONTINUED | OUTPATIENT
Start: 2020-03-30 | End: 2020-03-31 | Stop reason: HOSPADM

## 2020-03-30 RX ADMIN — HEPARIN SODIUM AND DEXTROSE 12 UNITS/KG/HR: 10000; 5 INJECTION INTRAVENOUS at 19:49

## 2020-03-30 RX ADMIN — ASPIRIN 81 MG 324 MG: 81 TABLET ORAL at 18:01

## 2020-03-30 RX ADMIN — HEPARIN SODIUM 4000 UNITS: 1000 INJECTION, SOLUTION INTRAVENOUS; SUBCUTANEOUS at 19:51

## 2020-03-30 NOTE — ED PROVIDER NOTES
History  Chief Complaint   Patient presents with    Chest Pain     Pt arrives via EMS from home in which he started with SOB walking up the stairs and left sided chest pain  Denies pain currently      Patient is a 69-year-old male history of diabetes, peripheral vascular disease hypertension hyperlipidemia  Presents emergency room for complaints of left-sided chest pain  States it has been intermittent over the last 2 days  Associated with activity, relieved with rest   No associated nausea diaphoresis or radiation of the pain into his neck back or arms  Denies taking any aspirin prior to arrival   States he had a minor heart attack many years ago  Does admit to undergoing peripheral vascular evaluation 4 months ago but has not had any follow-up  Prior to Admission Medications   Prescriptions Last Dose Informant Patient Reported? Taking?    Blood Glucose Monitoring Suppl (FREESTYLE FREEDOM LITE) w/Device KIT  Self Yes No   Sig: by Does not apply route   Blood Pressure KIT  Self Yes No   Sig: by Does not apply route   Lancets (FREESTYLE) lancets  Self Yes No   Sig: by Does not apply route daily   acetaminophen (TYLENOL) 500 mg tablet Not Taking at Unknown time Self No No   Sig: Take 2 tablets (1,000 mg total) by mouth every 6 (six) hours as needed for mild pain or moderate pain   Patient not taking: Reported on 3/30/2020   amLODIPine (NORVASC) 5 mg tablet 3/30/2020 at Unknown time Self No Yes   Sig: Take 1 tablet (5 mg total) by mouth daily   Patient taking differently: Take 10 mg by mouth daily    aspirin 81 MG tablet 3/30/2020 at Unknown time Self No Yes   Sig: Take 1 tablet (81 mg total) by mouth daily   atorvastatin (LIPITOR) 10 mg tablet Not Taking at Unknown time Self No No   Sig: Take 1 tablet (10 mg total) by mouth daily   Patient not taking: Reported on 3/30/2020   benzonatate (TESSALON PERLES) 100 mg capsule Not Taking at Unknown time Self No No   Sig: Take 1 capsule (100 mg total) by mouth every 8 (eight) hours as needed for cough   Patient not taking: Reported on 3/30/2020   losartan (COZAAR) 100 MG tablet 3/30/2020 at Unknown time Self No Yes   Sig: Take 1 tablet (100 mg total) by mouth daily   methocarbamol (ROBAXIN) 500 mg tablet Not Taking at Unknown time Self No No   Sig: Take 1 tablet (500 mg total) by mouth 2 (two) times a day   Patient not taking: Reported on 3/30/2020   metoprolol succinate (TOPROL-XL) 50 mg 24 hr tablet 3/30/2020 at Unknown time Self No Yes   Sig: Take 1 tablet (50 mg total) by mouth daily   naproxen (NAPROSYN) 500 mg tablet Not Taking at Unknown time Self No No   Sig: Take 1 tablet (500 mg total) by mouth 2 (two) times a day with meals   Patient not taking: Reported on 3/30/2020      Facility-Administered Medications: None       Past Medical History:   Diagnosis Date    Heart disease     Hyperlipidemia     Hypertension        Past Surgical History:   Procedure Laterality Date    CARDIAC SURGERY      CORONARY ARTERY BYPASS GRAFT  2009       Family History   Problem Relation Age of Onset    Diabetes Mother     Heart disease Mother     Diabetes Father     Heart disease Father     Hypertension Brother     Mental illness Family         DISORDER     I have reviewed and agree with the history as documented  E-Cigarette/Vaping    E-Cigarette Use Never User      E-Cigarette/Vaping Substances    Nicotine No     THC No     CBD No      Social History     Tobacco Use    Smoking status: Never Smoker    Smokeless tobacco: Never Used   Substance Use Topics    Alcohol use: No    Drug use: No       Review of Systems   Constitutional: Negative  Negative for chills and fever  HENT: Negative  Negative for rhinorrhea, sore throat, trouble swallowing and voice change  Eyes: Negative  Negative for pain and visual disturbance  Respiratory: Negative  Negative for cough, shortness of breath and wheezing  Cardiovascular: Positive for chest pain   Negative for palpitations  Gastrointestinal: Negative for abdominal pain, diarrhea, nausea and vomiting  Genitourinary: Negative  Negative for dysuria and frequency  Musculoskeletal: Negative  Negative for neck pain and neck stiffness  Skin: Negative  Negative for rash  Neurological: Negative  Negative for dizziness, speech difficulty, weakness, light-headedness and numbness  Physical Exam  Physical Exam   Constitutional: He is oriented to person, place, and time  He appears well-developed and well-nourished  No distress  HENT:   Head: Normocephalic and atraumatic  Mouth/Throat: Oropharynx is clear and moist    Eyes: Pupils are equal, round, and reactive to light  Conjunctivae and EOM are normal    Neck: Normal range of motion  Neck supple  No tracheal deviation present  Cardiovascular: Normal rate, regular rhythm and intact distal pulses  Pulmonary/Chest: Effort normal and breath sounds normal  No respiratory distress  He has no wheezes  He has no rales  Abdominal: Soft  Bowel sounds are normal  He exhibits no distension  There is no tenderness  There is no rebound and no guarding  Musculoskeletal: Normal range of motion  He exhibits no tenderness or deformity  Neurological: He is alert and oriented to person, place, and time  Skin: Skin is warm and dry  Capillary refill takes less than 2 seconds  No rash noted  Psychiatric: He has a normal mood and affect  His behavior is normal    Nursing note and vitals reviewed        Vital Signs  ED Triage Vitals [03/30/20 1743]   Temperature Pulse Respirations Blood Pressure SpO2   98 7 °F (37 1 °C) 62 20 154/80 100 %      Temp Source Heart Rate Source Patient Position - Orthostatic VS BP Location FiO2 (%)   Tympanic Monitor Lying Left arm --      Pain Score       No Pain           Vitals:    03/30/20 1830 03/30/20 1900 03/30/20 1930 03/30/20 1945   BP:  146/85 148/87 156/87   Pulse: 64 63 63 63   Patient Position - Orthostatic VS:   Lying Visual Acuity      ED Medications  Medications   heparin (porcine) 25,000 units in 250 mL infusion (premix) (12 Units/kg/hr × 75 kg (Order-Specific) Intravenous New Bag 3/30/20 1949)   heparin (porcine) injection 4,000 Units (has no administration in time range)   heparin (porcine) injection 2,000 Units (has no administration in time range)   aspirin chewable tablet 324 mg (324 mg Oral Given 3/30/20 1801)   heparin (porcine) injection 4,000 Units (4,000 Units Intravenous Given 3/30/20 1951)       Diagnostic Studies  Results Reviewed     Procedure Component Value Units Date/Time    Troponin I [[id number]]  (Abnormal) Collected:  03/30/20 2038    Lab Status:  Final result Specimen:  Blood from Arm, Right Updated:  03/30/20 2107     Troponin I 2 85 ng/mL     APTT six (6) hours after Heparin bolus/drip initiation or dosing change [[id number]]  (Normal) Collected:  03/30/20 1945    Lab Status:  Final result Specimen:  Blood from Arm, Left Updated:  03/30/20 2004     PTT 32 seconds     Protime-INR [[id number]]  (Abnormal) Collected:  03/30/20 1945    Lab Status:  Final result Specimen:  Blood from Arm, Left Updated:  03/30/20 2004     Protime 14 6 seconds      INR 1 20    Troponin I [[id number]]  (Abnormal) Collected:  03/30/20 1802    Lab Status:  Final result Specimen:  Blood from Arm, Left Updated:  03/30/20 1836     Troponin I 1 04 ng/mL     NT-BNP PRO [[id number]]  (Normal) Collected:  03/30/20 1802    Lab Status:  Final result Specimen:  Blood from Arm, Left Updated:  03/30/20 1834     NT-proBNP 225 pg/mL     Basic metabolic panel [[id number]]  (Abnormal) Collected:  03/30/20 1802    Lab Status:  Final result Specimen:  Blood from Arm, Left Updated:  03/30/20 1828     Sodium 136 mmol/L      Potassium 4 0 mmol/L      Chloride 105 mmol/L      CO2 24 mmol/L      ANION GAP 7 mmol/L      BUN 10 mg/dL      Creatinine 0 89 mg/dL      Glucose 157 mg/dL      Calcium 8 6 mg/dL      eGFR 90 ml/min/1 73sq m     Narrative: [facility] guidelines for Chronic Kidney Disease (CKD):     Stage 1 with normal or high GFR (GFR > 90 mL/min/1 73 square meters)    Stage 2 Mild CKD (GFR = 60-89 mL/min/1 73 square meters)    Stage 3A Moderate CKD (GFR = 45-59 mL/min/1 73 square meters)    Stage 3B Moderate CKD (GFR = 30-44 mL/min/1 73 square meters)    Stage 4 Severe CKD (GFR = 15-29 mL/min/1 73 square meters)    Stage 5 End Stage CKD (GFR <15 mL/min/1 73 square meters)  Note: GFR calculation is accurate only with a steady state creatinine    CBC and differential [[id number]]  (Abnormal) Collected:  03/30/20 1802    Lab Status:  Final result Specimen:  Blood from Arm, Left Updated:  03/30/20 1814     WBC 4 90 Thousand/uL      RBC 4 25 Million/uL      Hemoglobin 14 2 g/dL      Hematocrit 39 8 %      MCV 94 fL      MCH 33 3 pg      MCHC 35 6 g/dL      RDW 12 5 %      MPV 11 8 fL      Platelets 591 Thousands/uL                  X-ray chest 1 view portable   Final Result by Kalin Bone MD (03/30 1908)      No evidence of acute abnormality in the chest             Workstation performed: [id number]                    Procedures  Procedures         ED Course  ED Course as of Mar 30 2111   Mon Mar 30, 2020   1810 Procedure Note: EKG  Date/Time: 03/30/20 6:10 PM   Performed by: Felicity Navarro  Authorized by: Felicity Navarro  ECG interpreted by me, the ED Provider: yes   The EKG demonstrates:  Rate 64  Rhythm sinus  QTc 410  Depressions in lateral leads V4-V6  No comparison EKG available          1924 Patient updated on results  Aware of need for transfer and agreeable with plan  Heparin ordered  Patient chest pain free  Repeat EKG pending        1947 Procedure Note: EKG  Date/Time: 03/30/20 7:47 PM   Performed by: Felicity Navarro  Authorized by: Felicity Navarro  ECG interpreted by me, the ED Provider: yes   The EKG demonstrates:  Rate 57  Rhythm sinus cynthia w/ 1st degree AV block  QTc 401  Improved but still present minor lateral ischemic depressions in V4-V6              HEART Risk Score      Most Recent Value   Heart Score Risk Calculator   History  2 Filed at: 03/30/2020 1920   ECG  2 Filed at: 03/30/2020 1920   Age  2 Filed at: 03/30/2020 1920   Risk Factors  2 Filed at: 03/30/2020 1920   Troponin  2 Filed at: 03/30/2020 1920   HEART Score  10 Filed at: 03/30/2020 1920                              MDM  Number of Diagnoses or Management Options  Diagnosis management comments: Patient is 69-year-old male who presented for concerns of intermittent chest pain over last 2 days it was worsening recently  Associated with exertion and relieved by rest   Patient's initial EKG had ST depressions on his lateral leads of V4, V5 and V6  He was chest pain-free on arrival   A received aspirin  Initial troponin came back elevated at 1 06  No comparisons were available, he is still currently chest pain-free  Will be started on a heparin infusion and transferred to higher level of care for further evaluation by Cardiology  Amount and/or Complexity of Data Reviewed  Clinical lab tests: ordered and reviewed  Tests in the radiology section of CPT®: ordered and reviewed  Tests in the medicine section of CPT®: ordered and reviewed          Disposition  Final diagnoses:   Elevated troponin   Chest pain     Time reflects when diagnosis was documented in both MDM as applicable and the Disposition within this note     Time User Action Codes Description Comment    3/30/2020  7:16 PM Olya Ovalle Add [R79 89] Elevated troponin     3/30/2020  7:17 PM Olya Ovalle Add [R07 9] Chest pain       ED Disposition     ED Disposition Condition Date/Time Comment    Transfer to Another Facility-In Network  Mon Mar 30, 2020  7:18 PM Micklacey Rossi should be transferred out to [facility] Documentation      Most Recent Value   Patient Condition  The patient has been stabilized such that within reasonable medical probability, no material deterioration of the patient condition or the condition of the unborn child(rosalino) is likely to result from the transfer   Reason for Transfer  Level of Care needed not available at this facility   Benefits of Transfer  Specialized equipment and/or services available at the receiving facility (Include comment)________________________   Risks of Transfer  Potential for delay in receiving treatment, Potential deterioration of medical condition, Loss of IV, Increased discomfort during transfer, Possible worsening of condition or death during transfer      Follow-up Information    None         Patient's Medications   Discharge Prescriptions    No medications on file     No discharge procedures on file      PDMP Review     None          ED Provider  Electronically Signed by           Michoacano Willard DO  03/30/20 3901

## 2020-03-30 NOTE — EMTALA/ACUTE CARE TRANSFER
[facility] EMERGENCY DEPARTMENT  [address] [facility] [address]  [phone]  Dept: [phone]      EMTALA TRANSFER CONSENT    NAME Valery Fernandez                                         1955                              MRN [id number]    I have been informed of my rights regarding examination, treatment, and transfer   by Dr Katia Phelps DO    Benefits:  higher level of care    Risks:  death, discomfort, loss of customary lifestyle      Consent for Transfer:  I acknowledge that my medical condition has been evaluated and explained to me by the emergency department physician or other qualified medical person and/or my attending physician, who has recommended that I be transferred to the service of    at    The above potential benefits of such transfer, the potential risks associated with such transfer, and the probable risks of not being transferred have been explained to me, and I fully understand them  The doctor has explained that, in my case, the benefits of transfer outweigh the risks  I agree to be transferred  I authorize the performance of emergency medical procedures and treatments upon me in both transit and upon arrival at the receiving facility  Additionally, I authorize the release of any and all medical records to the receiving facility and request they be transported with me, if possible  I understand that the safest mode of transportation during a medical emergency is an ambulance and that the Hospital advocates the use of this mode of transport  Risks of traveling to the receiving facility by car, including absence of medical control, life sustaining equipment, such as oxygen, and medical personnel has been explained to me and I fully understand them  (MARCELL CORRECT BOX BELOW)  [  ]  I consent to the stated transfer and to be transported by ambulance/helicopter    [  ]  I consent to the stated transfer, but refuse transportation by ambulance and accept full responsibility for my transportation by car  I understand the risks of non-ambulance transfers and I exonerate the Hospital and its staff from any deterioration in my condition that results from this refusal     X___________________________________________    DATE  20  TIME________  Signature of patient or legally responsible individual signing on patient behalf           RELATIONSHIP TO PATIENT_________________________          Provider Certification    NAME Mick Rossi                                         1955                              MRN [id number]    A medical screening exam was performed on the above named patient  Based on the examination:    Condition Necessitating Transfer The primary encounter diagnosis was Elevated troponin  A diagnosis of Chest pain was also pertinent to this visit  Patient Condition:      Reason for Transfer:      Transfer Requirements: Facility     · Space available and qualified personnel available for treatment as acknowledged by    · Agreed to accept transfer and to provide appropriate medical treatment as acknowledged by          · Appropriate medical records of the examination and treatment of the patient are provided at the time of transfer   [address] [facility], [address] _______  · Transfer will be performed by qualified personnel from    and appropriate transfer equipment as required, including the use of necessary and appropriate life support measures      Provider Certification: I have examined the patient and explained the following risks and benefits of being transferred/refusing transfer to the patient/family:         Based on these reasonable risks and benefits to the patient and/or the unborn child(rosalino), and based upon the information available at the time of the patients examination, I certify that the medical benefits reasonably to be expected from the provision of appropriate medical treatments at another medical facility outweigh the increasing risks, if any, to the individuals medical condition, and in the case of labor to the unborn child, from effecting the transfer      X____________________________________________ DATE 03/30/20        TIME_______      ORIGINAL - SEND TO MEDICAL RECORDS   COPY - SEND WITH PATIENT DURING TRANSFER

## 2020-03-31 ENCOUNTER — APPOINTMENT (INPATIENT)
Dept: NON INVASIVE DIAGNOSTICS | Facility: HOSPITAL | Age: 65
DRG: 282 | End: 2020-03-31
Payer: MEDICARE

## 2020-03-31 ENCOUNTER — HOSPITAL ENCOUNTER (INPATIENT)
Facility: HOSPITAL | Age: 65
LOS: 1 days | Discharge: HOME | DRG: 282 | End: 2020-04-01
Attending: INTERNAL MEDICINE | Admitting: INTERNAL MEDICINE
Payer: MEDICARE

## 2020-03-31 VITALS
RESPIRATION RATE: 20 BRPM | OXYGEN SATURATION: 97 % | BODY MASS INDEX: 23.92 KG/M2 | HEART RATE: 61 BPM | TEMPERATURE: 98.7 F | SYSTOLIC BLOOD PRESSURE: 168 MMHG | DIASTOLIC BLOOD PRESSURE: 82 MMHG | WEIGHT: 176.37 LBS

## 2020-03-31 DIAGNOSIS — I21.4 NSTEMI (NON-ST ELEVATED MYOCARDIAL INFARCTION) (HCC): Primary | ICD-10-CM

## 2020-03-31 DIAGNOSIS — I10 BENIGN ESSENTIAL HYPERTENSION: ICD-10-CM

## 2020-03-31 DIAGNOSIS — E78.2 MIXED HYPERLIPIDEMIA: ICD-10-CM

## 2020-03-31 DIAGNOSIS — I25.10 CORONARY ARTERY DISEASE INVOLVING NATIVE CORONARY ARTERY OF NATIVE HEART WITHOUT ANGINA PECTORIS: ICD-10-CM

## 2020-03-31 PROBLEM — D69.6 THROMBOCYTOPENIA (HCC): Status: ACTIVE | Noted: 2020-03-31

## 2020-03-31 LAB
ANION GAP SERPL CALCULATED.3IONS-SCNC: 6 MMOL/L (ref 4–13)
APTT PPP: 61 SECONDS (ref 23–37)
APTT PPP: 67 SECONDS (ref 23–37)
ATRIAL RATE: 57 BPM
ATRIAL RATE: 63 BPM
ATRIAL RATE: 64 BPM
ATRIAL RATE: 72 BPM
ATRIAL RATE: 74 BPM
BUN SERPL-MCNC: 9 MG/DL (ref 5–25)
CALCIUM SERPL-MCNC: 8.9 MG/DL (ref 8.3–10.1)
CHLORIDE SERPL-SCNC: 108 MMOL/L (ref 100–108)
CHOLEST SERPL-MCNC: 120 MG/DL (ref 50–200)
CO2 SERPL-SCNC: 22 MMOL/L (ref 21–32)
CREAT SERPL-MCNC: 0.85 MG/DL (ref 0.6–1.3)
ERYTHROCYTE [DISTWIDTH] IN BLOOD BY AUTOMATED COUNT: 11.9 % (ref 11.6–15.1)
EST. AVERAGE GLUCOSE BLD GHB EST-MCNC: 120 MG/DL
GFR SERPL CREATININE-BSD FRML MDRD: 91 ML/MIN/1.73SQ M
GLUCOSE SERPL-MCNC: 119 MG/DL (ref 65–140)
GLUCOSE SERPL-MCNC: 121 MG/DL (ref 65–140)
GLUCOSE SERPL-MCNC: 139 MG/DL (ref 65–140)
GLUCOSE SERPL-MCNC: 140 MG/DL (ref 65–140)
GLUCOSE SERPL-MCNC: 153 MG/DL (ref 65–140)
GLUCOSE SERPL-MCNC: 164 MG/DL (ref 65–140)
HBA1C MFR BLD: 5.8 %
HCT VFR BLD AUTO: 42.7 % (ref 36.5–49.3)
HDLC SERPL-MCNC: 31 MG/DL
HGB BLD-MCNC: 14.7 G/DL (ref 12–17)
LDLC SERPL CALC-MCNC: 45 MG/DL (ref 0–100)
MCH RBC QN AUTO: 32.2 PG (ref 26.8–34.3)
MCHC RBC AUTO-ENTMCNC: 34.4 G/DL (ref 31.4–37.4)
MCV RBC AUTO: 93 FL (ref 82–98)
P AXIS: 44 DEGREES
P AXIS: 50 DEGREES
P AXIS: 54 DEGREES
P AXIS: 63 DEGREES
P AXIS: 71 DEGREES
PLATELET # BLD AUTO: 117 THOUSANDS/UL (ref 149–390)
PMV BLD AUTO: 13.7 FL (ref 8.9–12.7)
POTASSIUM SERPL-SCNC: 4.1 MMOL/L (ref 3.5–5.3)
PR INTERVAL: 180 MS
PR INTERVAL: 188 MS
PR INTERVAL: 194 MS
PR INTERVAL: 216 MS
PR INTERVAL: 224 MS
QRS AXIS: 12 DEGREES
QRS AXIS: 20 DEGREES
QRS AXIS: 21 DEGREES
QRS AXIS: 36 DEGREES
QRS AXIS: 39 DEGREES
QRSD INTERVAL: 100 MS
QRSD INTERVAL: 88 MS
QRSD INTERVAL: 96 MS
QRSD INTERVAL: 96 MS
QRSD INTERVAL: 98 MS
QT INTERVAL: 366 MS
QT INTERVAL: 368 MS
QT INTERVAL: 382 MS
QT INTERVAL: 398 MS
QT INTERVAL: 412 MS
QTC INTERVAL: 390 MS
QTC INTERVAL: 401 MS
QTC INTERVAL: 402 MS
QTC INTERVAL: 406 MS
QTC INTERVAL: 410 MS
RBC # BLD AUTO: 4.57 MILLION/UL (ref 3.88–5.62)
SODIUM SERPL-SCNC: 136 MMOL/L (ref 136–145)
T WAVE AXIS: 59 DEGREES
T WAVE AXIS: 79 DEGREES
T WAVE AXIS: 86 DEGREES
T WAVE AXIS: 89 DEGREES
T WAVE AXIS: 92 DEGREES
TRIGL SERPL-MCNC: 220 MG/DL
TROPONIN I SERPL-MCNC: 2.49 NG/ML
TROPONIN I SERPL-MCNC: 3.31 NG/ML (ref 0–0.03)
TROPONIN I SERPL-MCNC: 4.61 NG/ML
VENTRICULAR RATE: 57 BPM
VENTRICULAR RATE: 63 BPM
VENTRICULAR RATE: 64 BPM
VENTRICULAR RATE: 72 BPM
VENTRICULAR RATE: 74 BPM
WBC # BLD AUTO: 4.74 THOUSAND/UL (ref 4.31–10.16)

## 2020-03-31 PROCEDURE — 93455 CORONARY ART/GRFT ANGIO S&I: CPT | Performed by: STUDENT IN AN ORGANIZED HEALTH CARE EDUCATION/TRAINING PROGRAM

## 2020-03-31 PROCEDURE — C1769 GUIDE WIRE: HCPCS | Performed by: STUDENT IN AN ORGANIZED HEALTH CARE EDUCATION/TRAINING PROGRAM

## 2020-03-31 PROCEDURE — 93005 ELECTROCARDIOGRAM TRACING: CPT

## 2020-03-31 PROCEDURE — 93306 TTE W/DOPPLER COMPLETE: CPT

## 2020-03-31 PROCEDURE — 85027 COMPLETE CBC AUTOMATED: CPT | Performed by: INTERNAL MEDICINE

## 2020-03-31 PROCEDURE — 99152 MOD SED SAME PHYS/QHP 5/>YRS: CPT | Performed by: INTERNAL MEDICINE

## 2020-03-31 PROCEDURE — C1894 INTRO/SHEATH, NON-LASER: HCPCS | Performed by: STUDENT IN AN ORGANIZED HEALTH CARE EDUCATION/TRAINING PROGRAM

## 2020-03-31 PROCEDURE — 80048 BASIC METABOLIC PNL TOTAL CA: CPT | Performed by: INTERNAL MEDICINE

## 2020-03-31 PROCEDURE — 93567 NJX CAR CTH SPRVLV AORTGRPHY: CPT | Performed by: INTERNAL MEDICINE

## 2020-03-31 PROCEDURE — 93455 CORONARY ART/GRFT ANGIO S&I: CPT | Performed by: INTERNAL MEDICINE

## 2020-03-31 PROCEDURE — 84484 ASSAY OF TROPONIN QUANT: CPT | Performed by: EMERGENCY MEDICINE

## 2020-03-31 PROCEDURE — B2181ZZ FLUOROSCOPY OF LEFT INTERNAL MAMMARY BYPASS GRAFT USING LOW OSMOLAR CONTRAST: ICD-10-PCS | Performed by: INTERNAL MEDICINE

## 2020-03-31 PROCEDURE — 85730 THROMBOPLASTIN TIME PARTIAL: CPT | Performed by: INTERNAL MEDICINE

## 2020-03-31 PROCEDURE — B2111ZZ FLUOROSCOPY OF MULTIPLE CORONARY ARTERIES USING LOW OSMOLAR CONTRAST: ICD-10-PCS | Performed by: INTERNAL MEDICINE

## 2020-03-31 PROCEDURE — 83036 HEMOGLOBIN GLYCOSYLATED A1C: CPT | Performed by: INTERNAL MEDICINE

## 2020-03-31 PROCEDURE — 93010 ELECTROCARDIOGRAM REPORT: CPT | Performed by: INTERNAL MEDICINE

## 2020-03-31 PROCEDURE — B3101ZZ FLUOROSCOPY OF THORACIC AORTA USING LOW OSMOLAR CONTRAST: ICD-10-PCS | Performed by: INTERNAL MEDICINE

## 2020-03-31 PROCEDURE — 80061 LIPID PANEL: CPT | Performed by: INTERNAL MEDICINE

## 2020-03-31 PROCEDURE — RECHECK: Performed by: NURSE PRACTITIONER

## 2020-03-31 PROCEDURE — 90662 IIV NO PRSV INCREASED AG IM: CPT | Performed by: INTERNAL MEDICINE

## 2020-03-31 PROCEDURE — 93306 TTE W/DOPPLER COMPLETE: CPT | Performed by: INTERNAL MEDICINE

## 2020-03-31 PROCEDURE — G0008 ADMIN INFLUENZA VIRUS VAC: HCPCS | Performed by: INTERNAL MEDICINE

## 2020-03-31 PROCEDURE — B51V1ZZ FLUOROSCOPY OF OTHER VEINS USING LOW OSMOLAR CONTRAST: ICD-10-PCS | Performed by: INTERNAL MEDICINE

## 2020-03-31 PROCEDURE — 99152 MOD SED SAME PHYS/QHP 5/>YRS: CPT | Performed by: STUDENT IN AN ORGANIZED HEALTH CARE EDUCATION/TRAINING PROGRAM

## 2020-03-31 PROCEDURE — 99236 HOSP IP/OBS SAME DATE HI 85: CPT | Performed by: INTERNAL MEDICINE

## 2020-03-31 PROCEDURE — 99223 1ST HOSP IP/OBS HIGH 75: CPT | Performed by: INTERNAL MEDICINE

## 2020-03-31 PROCEDURE — 82948 REAGENT STRIP/BLOOD GLUCOSE: CPT

## 2020-03-31 PROCEDURE — 84484 ASSAY OF TROPONIN QUANT: CPT | Performed by: INTERNAL MEDICINE

## 2020-03-31 PROCEDURE — 36415 COLL VENOUS BLD VENIPUNCTURE: CPT | Performed by: EMERGENCY MEDICINE

## 2020-03-31 RX ORDER — HEPARIN SODIUM 10000 [USP'U]/100ML
3-20 INJECTION, SOLUTION INTRAVENOUS
Status: DISCONTINUED | OUTPATIENT
Start: 2020-03-31 | End: 2020-03-31

## 2020-03-31 RX ORDER — HEPARIN SODIUM 1000 [USP'U]/ML
INJECTION, SOLUTION INTRAVENOUS; SUBCUTANEOUS CODE/TRAUMA/SEDATION MEDICATION
Status: COMPLETED | OUTPATIENT
Start: 2020-03-31 | End: 2020-03-31

## 2020-03-31 RX ORDER — FENTANYL CITRATE 50 UG/ML
INJECTION, SOLUTION INTRAMUSCULAR; INTRAVENOUS CODE/TRAUMA/SEDATION MEDICATION
Status: COMPLETED | OUTPATIENT
Start: 2020-03-31 | End: 2020-03-31

## 2020-03-31 RX ORDER — LIDOCAINE HYDROCHLORIDE 10 MG/ML
INJECTION, SOLUTION EPIDURAL; INFILTRATION; INTRACAUDAL; PERINEURAL CODE/TRAUMA/SEDATION MEDICATION
Status: COMPLETED | OUTPATIENT
Start: 2020-03-31 | End: 2020-03-31

## 2020-03-31 RX ORDER — LOSARTAN POTASSIUM 50 MG/1
100 TABLET ORAL DAILY
Status: DISCONTINUED | OUTPATIENT
Start: 2020-03-31 | End: 2020-03-31

## 2020-03-31 RX ORDER — ISOSORBIDE MONONITRATE 30 MG/1
30 TABLET, EXTENDED RELEASE ORAL DAILY
Status: DISCONTINUED | OUTPATIENT
Start: 2020-03-31 | End: 2020-04-01 | Stop reason: HOSPADM

## 2020-03-31 RX ORDER — MIDAZOLAM HYDROCHLORIDE 2 MG/2ML
INJECTION, SOLUTION INTRAMUSCULAR; INTRAVENOUS CODE/TRAUMA/SEDATION MEDICATION
Status: COMPLETED | OUTPATIENT
Start: 2020-03-31 | End: 2020-03-31

## 2020-03-31 RX ORDER — METOPROLOL SUCCINATE 50 MG/1
50 TABLET, EXTENDED RELEASE ORAL DAILY
Status: DISCONTINUED | OUTPATIENT
Start: 2020-03-31 | End: 2020-04-01 | Stop reason: HOSPADM

## 2020-03-31 RX ORDER — VERAPAMIL HYDROCHLORIDE 2.5 MG/ML
INJECTION, SOLUTION INTRAVENOUS CODE/TRAUMA/SEDATION MEDICATION
Status: COMPLETED | OUTPATIENT
Start: 2020-03-31 | End: 2020-03-31

## 2020-03-31 RX ORDER — HEPARIN SODIUM 1000 [USP'U]/ML
2000 INJECTION, SOLUTION INTRAVENOUS; SUBCUTANEOUS
Status: DISCONTINUED | OUTPATIENT
Start: 2020-03-31 | End: 2020-03-31

## 2020-03-31 RX ORDER — AMLODIPINE BESYLATE 10 MG/1
10 TABLET ORAL DAILY
Status: DISCONTINUED | OUTPATIENT
Start: 2020-03-31 | End: 2020-04-01 | Stop reason: HOSPADM

## 2020-03-31 RX ORDER — SODIUM CHLORIDE 9 MG/ML
100 INJECTION, SOLUTION INTRAVENOUS CONTINUOUS
Status: DISPENSED | OUTPATIENT
Start: 2020-03-31 | End: 2020-03-31

## 2020-03-31 RX ORDER — NITROGLYCERIN 0.4 MG/1
0.4 TABLET SUBLINGUAL
Status: DISCONTINUED | OUTPATIENT
Start: 2020-03-31 | End: 2020-04-01 | Stop reason: HOSPADM

## 2020-03-31 RX ORDER — ACETAMINOPHEN 325 MG/1
650 TABLET ORAL EVERY 6 HOURS PRN
Status: DISCONTINUED | OUTPATIENT
Start: 2020-03-31 | End: 2020-04-01 | Stop reason: HOSPADM

## 2020-03-31 RX ORDER — ATORVASTATIN CALCIUM 40 MG/1
40 TABLET, FILM COATED ORAL
Status: DISCONTINUED | OUTPATIENT
Start: 2020-03-31 | End: 2020-04-01

## 2020-03-31 RX ORDER — NITROGLYCERIN 20 MG/100ML
INJECTION INTRAVENOUS CODE/TRAUMA/SEDATION MEDICATION
Status: COMPLETED | OUTPATIENT
Start: 2020-03-31 | End: 2020-03-31

## 2020-03-31 RX ORDER — HEPARIN SODIUM 1000 [USP'U]/ML
4000 INJECTION, SOLUTION INTRAVENOUS; SUBCUTANEOUS
Status: DISCONTINUED | OUTPATIENT
Start: 2020-03-31 | End: 2020-03-31

## 2020-03-31 RX ORDER — ASPIRIN 81 MG/1
81 TABLET, CHEWABLE ORAL DAILY
Status: DISCONTINUED | OUTPATIENT
Start: 2020-03-31 | End: 2020-04-01 | Stop reason: HOSPADM

## 2020-03-31 RX ADMIN — Medication 200 MCG: at 12:52

## 2020-03-31 RX ADMIN — VERAPAMIL HYDROCHLORIDE 2.5 MG: 2.5 INJECTION INTRAVENOUS at 12:52

## 2020-03-31 RX ADMIN — ATORVASTATIN CALCIUM 40 MG: 40 TABLET, FILM COATED ORAL at 17:08

## 2020-03-31 RX ADMIN — LOSARTAN POTASSIUM 100 MG: 50 TABLET, FILM COATED ORAL at 08:13

## 2020-03-31 RX ADMIN — FENTANYL CITRATE 50 MCG: 50 INJECTION, SOLUTION INTRAMUSCULAR; INTRAVENOUS at 12:49

## 2020-03-31 RX ADMIN — HEPARIN SODIUM AND DEXTROSE 12 UNITS/KG/HR: 10000; 5 INJECTION INTRAVENOUS at 02:58

## 2020-03-31 RX ADMIN — INSULIN LISPRO 1 UNITS: 100 INJECTION, SOLUTION INTRAVENOUS; SUBCUTANEOUS at 06:03

## 2020-03-31 RX ADMIN — METOPROLOL SUCCINATE 50 MG: 50 TABLET, EXTENDED RELEASE ORAL at 08:13

## 2020-03-31 RX ADMIN — MIDAZOLAM 2 MG: 1 INJECTION INTRAMUSCULAR; INTRAVENOUS at 12:49

## 2020-03-31 RX ADMIN — AMLODIPINE BESYLATE 10 MG: 10 TABLET ORAL at 08:13

## 2020-03-31 RX ADMIN — TICAGRELOR 180 MG: 90 TABLET ORAL at 13:50

## 2020-03-31 RX ADMIN — ISOSORBIDE MONONITRATE 30 MG: 30 TABLET, EXTENDED RELEASE ORAL at 14:49

## 2020-03-31 RX ADMIN — SODIUM CHLORIDE 100 ML/HR: 0.9 INJECTION, SOLUTION INTRAVENOUS at 09:26

## 2020-03-31 RX ADMIN — ASPIRIN 81 MG 81 MG: 81 TABLET ORAL at 08:12

## 2020-03-31 RX ADMIN — LIDOCAINE HYDROCHLORIDE 1 ML: 10 INJECTION, SOLUTION EPIDURAL; INFILTRATION; INTRACAUDAL; PERINEURAL at 12:49

## 2020-03-31 RX ADMIN — INFLUENZA A VIRUS A/MICHIGAN/45/2015 X-275 (H1N1) ANTIGEN (FORMALDEHYDE INACTIVATED), INFLUENZA A VIRUS A/SINGAPORE/INFIMH-16-0019/2016 IVR-186 (H3N2) ANTIGEN (FORMALDEHYDE INACTIVATED), AND INFLUENZA B VIRUS B/MARYLAND/15/2016 BX-69A (A B/COLORADO/6/2017-LIKE VIRUS) ANTIGEN (FORMALDEHYDE INACTIVATED) 0.5 ML: 60; 60; 60 INJECTION, SUSPENSION INTRAMUSCULAR at 02:57

## 2020-03-31 RX ADMIN — SODIUM CHLORIDE 100 ML/HR: 0.9 INJECTION, SOLUTION INTRAVENOUS at 18:26

## 2020-03-31 RX ADMIN — SODIUM CHLORIDE 100 ML/HR: 0.9 INJECTION, SOLUTION INTRAVENOUS at 13:52

## 2020-03-31 RX ADMIN — IOHEXOL 120 ML: 350 INJECTION, SOLUTION INTRAVENOUS at 13:22

## 2020-03-31 RX ADMIN — HEPARIN SODIUM 4000 UNITS: 1000 INJECTION INTRAVENOUS; SUBCUTANEOUS at 12:51

## 2020-03-31 NOTE — ED NOTES
This nurse did go into the room to check on the patient after talking to Dr Irasema Mahmood to make sure he did not have any chest pain and patient was sitting on the side of the bed he had the heart monitor off, the BP cuff was on but the tubing attached to the cuff was appart and the pulse oximeter was not on    Patient stated he needed to void, given a urinal      Thor STEPHANIE Elam  03/30/20 0415

## 2020-03-31 NOTE — ED NOTES
Dr Irasema Mahmood made aware that the second troponin is 2 85 which is an elevation from previous one  No other orders at this time       Saúl Elam RN  03/30/20 7612

## 2020-03-31 NOTE — ED NOTES
Resting quietly  Monitor - sinus rhythm, sinus cynthia  No c/o chest pain at this time  No respiratory distress  Heparin drip continues as ordered       Declan Dent RN  03/30/20 8803

## 2020-04-01 VITALS
TEMPERATURE: 98.2 F | SYSTOLIC BLOOD PRESSURE: 115 MMHG | WEIGHT: 176.37 LBS | DIASTOLIC BLOOD PRESSURE: 73 MMHG | OXYGEN SATURATION: 98 % | HEIGHT: 72 IN | RESPIRATION RATE: 12 BRPM | BODY MASS INDEX: 23.89 KG/M2 | HEART RATE: 65 BPM

## 2020-04-01 LAB
ANION GAP SERPL CALCULATED.3IONS-SCNC: 6 MMOL/L (ref 4–13)
BASOPHILS # BLD AUTO: 0.02 THOUSANDS/ΜL (ref 0–0.1)
BASOPHILS NFR BLD AUTO: 0 % (ref 0–1)
BUN SERPL-MCNC: 15 MG/DL (ref 5–25)
CALCIUM SERPL-MCNC: 8.6 MG/DL (ref 8.3–10.1)
CHLORIDE SERPL-SCNC: 109 MMOL/L (ref 100–108)
CO2 SERPL-SCNC: 23 MMOL/L (ref 21–32)
CREAT SERPL-MCNC: 0.94 MG/DL (ref 0.6–1.3)
EOSINOPHIL # BLD AUTO: 0.01 THOUSAND/ΜL (ref 0–0.61)
EOSINOPHIL NFR BLD AUTO: 0 % (ref 0–6)
ERYTHROCYTE [DISTWIDTH] IN BLOOD BY AUTOMATED COUNT: 11.9 % (ref 11.6–15.1)
GFR SERPL CREATININE-BSD FRML MDRD: 85 ML/MIN/1.73SQ M
GLUCOSE SERPL-MCNC: 122 MG/DL (ref 65–140)
GLUCOSE SERPL-MCNC: 130 MG/DL (ref 65–140)
GLUCOSE SERPL-MCNC: 138 MG/DL (ref 65–140)
HCT VFR BLD AUTO: 40.6 % (ref 36.5–49.3)
HGB BLD-MCNC: 13.8 G/DL (ref 12–17)
IMM GRANULOCYTES # BLD AUTO: 0.01 THOUSAND/UL (ref 0–0.2)
IMM GRANULOCYTES NFR BLD AUTO: 0 % (ref 0–2)
LYMPHOCYTES # BLD AUTO: 1.49 THOUSANDS/ΜL (ref 0.6–4.47)
LYMPHOCYTES NFR BLD AUTO: 30 % (ref 14–44)
MCH RBC QN AUTO: 32.2 PG (ref 26.8–34.3)
MCHC RBC AUTO-ENTMCNC: 34 G/DL (ref 31.4–37.4)
MCV RBC AUTO: 95 FL (ref 82–98)
MONOCYTES # BLD AUTO: 0.89 THOUSAND/ΜL (ref 0.17–1.22)
MONOCYTES NFR BLD AUTO: 18 % (ref 4–12)
NEUTROPHILS # BLD AUTO: 2.5 THOUSANDS/ΜL (ref 1.85–7.62)
NEUTS SEG NFR BLD AUTO: 52 % (ref 43–75)
NRBC BLD AUTO-RTO: 0 /100 WBCS
PLATELET # BLD AUTO: 116 THOUSANDS/UL (ref 149–390)
PMV BLD AUTO: 13.9 FL (ref 8.9–12.7)
POTASSIUM SERPL-SCNC: 3.8 MMOL/L (ref 3.5–5.3)
RBC # BLD AUTO: 4.28 MILLION/UL (ref 3.88–5.62)
SODIUM SERPL-SCNC: 138 MMOL/L (ref 136–145)
WBC # BLD AUTO: 4.92 THOUSAND/UL (ref 4.31–10.16)

## 2020-04-01 PROCEDURE — 82948 REAGENT STRIP/BLOOD GLUCOSE: CPT

## 2020-04-01 PROCEDURE — 80048 BASIC METABOLIC PNL TOTAL CA: CPT | Performed by: STUDENT IN AN ORGANIZED HEALTH CARE EDUCATION/TRAINING PROGRAM

## 2020-04-01 PROCEDURE — 99232 SBSQ HOSP IP/OBS MODERATE 35: CPT | Performed by: INTERNAL MEDICINE

## 2020-04-01 PROCEDURE — 99239 HOSP IP/OBS DSCHRG MGMT >30: CPT | Performed by: NURSE PRACTITIONER

## 2020-04-01 PROCEDURE — 85025 COMPLETE CBC W/AUTO DIFF WBC: CPT | Performed by: NURSE PRACTITIONER

## 2020-04-01 RX ORDER — ATORVASTATIN CALCIUM 80 MG/1
80 TABLET, FILM COATED ORAL DAILY
Qty: 30 TABLET | Refills: 0 | Status: SHIPPED | OUTPATIENT
Start: 2020-04-01 | End: 2021-03-22 | Stop reason: SDUPTHER

## 2020-04-01 RX ORDER — AMLODIPINE BESYLATE 10 MG/1
10 TABLET ORAL DAILY
COMMUNITY
End: 2021-03-22 | Stop reason: SDUPTHER

## 2020-04-01 RX ORDER — ATORVASTATIN CALCIUM 80 MG/1
80 TABLET, FILM COATED ORAL
Status: DISCONTINUED | OUTPATIENT
Start: 2020-04-01 | End: 2020-04-01 | Stop reason: HOSPADM

## 2020-04-01 RX ORDER — NITROGLYCERIN 0.4 MG/1
0.4 TABLET SUBLINGUAL
Qty: 25 TABLET | Refills: 0 | Status: SHIPPED | OUTPATIENT
Start: 2020-04-01 | End: 2021-03-22 | Stop reason: SDUPTHER

## 2020-04-01 RX ORDER — ISOSORBIDE MONONITRATE 30 MG/1
30 TABLET, EXTENDED RELEASE ORAL DAILY
Qty: 30 TABLET | Refills: 0 | Status: SHIPPED | OUTPATIENT
Start: 2020-04-02 | End: 2021-03-22 | Stop reason: SDUPTHER

## 2020-04-01 RX ORDER — LOSARTAN POTASSIUM 50 MG/1
100 TABLET ORAL DAILY
Status: DISCONTINUED | OUTPATIENT
Start: 2020-04-01 | End: 2020-04-01 | Stop reason: HOSPADM

## 2020-04-01 RX ORDER — LOSARTAN POTASSIUM 25 MG/1
25 TABLET ORAL DAILY
Qty: 30 TABLET | Refills: 0 | Status: SHIPPED | OUTPATIENT
Start: 2020-04-01 | End: 2021-03-22 | Stop reason: SDUPTHER

## 2020-04-01 RX ADMIN — AMLODIPINE BESYLATE 10 MG: 10 TABLET ORAL at 09:02

## 2020-04-01 RX ADMIN — METOPROLOL SUCCINATE 50 MG: 50 TABLET, EXTENDED RELEASE ORAL at 09:01

## 2020-04-01 RX ADMIN — ISOSORBIDE MONONITRATE 30 MG: 30 TABLET, EXTENDED RELEASE ORAL at 09:02

## 2020-04-01 RX ADMIN — LOSARTAN POTASSIUM 100 MG: 50 TABLET, FILM COATED ORAL at 12:11

## 2020-04-01 RX ADMIN — ASPIRIN 81 MG 81 MG: 81 TABLET ORAL at 09:01

## 2020-04-01 RX ADMIN — TICAGRELOR 90 MG: 90 TABLET ORAL at 09:02

## 2020-04-06 ENCOUNTER — APPOINTMENT (EMERGENCY)
Dept: CT IMAGING | Facility: HOSPITAL | Age: 65
DRG: 282 | End: 2020-04-06
Payer: MEDICARE

## 2020-04-06 ENCOUNTER — APPOINTMENT (EMERGENCY)
Dept: RADIOLOGY | Facility: HOSPITAL | Age: 65
DRG: 282 | End: 2020-04-06
Payer: MEDICARE

## 2020-04-06 ENCOUNTER — HOSPITAL ENCOUNTER (INPATIENT)
Facility: HOSPITAL | Age: 65
LOS: 1 days | Discharge: HOME | DRG: 282 | End: 2020-04-07
Attending: EMERGENCY MEDICINE | Admitting: FAMILY MEDICINE
Payer: MEDICARE

## 2020-04-06 DIAGNOSIS — R42 DIZZINESS: Primary | ICD-10-CM

## 2020-04-06 DIAGNOSIS — R55 NEAR SYNCOPE: ICD-10-CM

## 2020-04-06 DIAGNOSIS — R42 DIZZINESSES: ICD-10-CM

## 2020-04-06 DIAGNOSIS — R94.31 PROLONGED Q-T INTERVAL ON ECG: ICD-10-CM

## 2020-04-06 DIAGNOSIS — I25.118 CORONARY ARTERY DISEASE OF NATIVE ARTERY OF NATIVE HEART WITH STABLE ANGINA PECTORIS (HCC): ICD-10-CM

## 2020-04-06 LAB
ALBUMIN SERPL BCP-MCNC: 4.1 G/DL (ref 3–5.2)
ALP SERPL-CCNC: 88 U/L (ref 43–122)
ALT SERPL W P-5'-P-CCNC: 48 U/L (ref 9–52)
ANION GAP SERPL CALCULATED.3IONS-SCNC: 10 MMOL/L (ref 5–14)
APTT PPP: 31 SECONDS (ref 23–37)
AST SERPL W P-5'-P-CCNC: 51 U/L (ref 17–59)
BASOPHILS # BLD AUTO: 0 THOUSANDS/ΜL (ref 0–0.1)
BASOPHILS NFR BLD AUTO: 1 % (ref 0–1)
BILIRUB SERPL-MCNC: 2 MG/DL
BUN SERPL-MCNC: 20 MG/DL (ref 5–25)
CALCIUM SERPL-MCNC: 8.7 MG/DL (ref 8.4–10.2)
CHLORIDE SERPL-SCNC: 106 MMOL/L (ref 97–108)
CO2 SERPL-SCNC: 20 MMOL/L (ref 22–30)
CREAT SERPL-MCNC: 1.02 MG/DL (ref 0.7–1.5)
EOSINOPHIL # BLD AUTO: 0 THOUSAND/ΜL (ref 0–0.4)
EOSINOPHIL NFR BLD AUTO: 1 % (ref 0–6)
ERYTHROCYTE [DISTWIDTH] IN BLOOD BY AUTOMATED COUNT: 12.7 %
GFR SERPL CREATININE-BSD FRML MDRD: 77 ML/MIN/1.73SQ M
GLUCOSE SERPL-MCNC: 147 MG/DL (ref 70–99)
HCT VFR BLD AUTO: 39.8 % (ref 41–53)
HGB BLD-MCNC: 13.6 G/DL (ref 13.5–17.5)
INR PPP: 1.16 (ref 0.84–1.19)
LYMPHOCYTES # BLD AUTO: 1.6 THOUSANDS/ΜL (ref 0.5–4)
LYMPHOCYTES NFR BLD AUTO: 28 % (ref 25–45)
MCH RBC QN AUTO: 32.1 PG (ref 26–34)
MCHC RBC AUTO-ENTMCNC: 34.1 G/DL (ref 31–36)
MCV RBC AUTO: 94 FL (ref 80–100)
MONOCYTES # BLD AUTO: 0.6 THOUSAND/ΜL (ref 0.2–0.9)
MONOCYTES NFR BLD AUTO: 10 % (ref 1–10)
NEUTROPHILS # BLD AUTO: 3.5 THOUSANDS/ΜL (ref 1.8–7.8)
NEUTS SEG NFR BLD AUTO: 62 % (ref 45–65)
PLATELET # BLD AUTO: 150 THOUSANDS/UL (ref 150–450)
PMV BLD AUTO: 12.3 FL (ref 8.9–12.7)
POTASSIUM SERPL-SCNC: 4.7 MMOL/L (ref 3.6–5)
PROT SERPL-MCNC: 8.3 G/DL (ref 5.9–8.4)
PROTHROMBIN TIME: 14.2 SECONDS (ref 11.6–14.5)
RBC # BLD AUTO: 4.23 MILLION/UL (ref 4.5–5.9)
SODIUM SERPL-SCNC: 136 MMOL/L (ref 137–147)
TROPONIN I SERPL-MCNC: <0.01 NG/ML (ref 0–0.03)
WBC # BLD AUTO: 5.7 THOUSAND/UL (ref 4.5–11)

## 2020-04-06 PROCEDURE — 71046 X-RAY EXAM CHEST 2 VIEWS: CPT

## 2020-04-06 PROCEDURE — 96360 HYDRATION IV INFUSION INIT: CPT

## 2020-04-06 PROCEDURE — 84484 ASSAY OF TROPONIN QUANT: CPT | Performed by: PHYSICIAN ASSISTANT

## 2020-04-06 PROCEDURE — 93005 ELECTROCARDIOGRAM TRACING: CPT

## 2020-04-06 PROCEDURE — 80053 COMPREHEN METABOLIC PANEL: CPT | Performed by: PHYSICIAN ASSISTANT

## 2020-04-06 PROCEDURE — 85025 COMPLETE CBC W/AUTO DIFF WBC: CPT | Performed by: PHYSICIAN ASSISTANT

## 2020-04-06 PROCEDURE — 36415 COLL VENOUS BLD VENIPUNCTURE: CPT | Performed by: PHYSICIAN ASSISTANT

## 2020-04-06 PROCEDURE — 99284 EMERGENCY DEPT VISIT MOD MDM: CPT | Performed by: PHYSICIAN ASSISTANT

## 2020-04-06 PROCEDURE — 85730 THROMBOPLASTIN TIME PARTIAL: CPT | Performed by: PHYSICIAN ASSISTANT

## 2020-04-06 PROCEDURE — 70450 CT HEAD/BRAIN W/O DYE: CPT

## 2020-04-06 PROCEDURE — 85610 PROTHROMBIN TIME: CPT | Performed by: PHYSICIAN ASSISTANT

## 2020-04-06 PROCEDURE — 96361 HYDRATE IV INFUSION ADD-ON: CPT

## 2020-04-06 PROCEDURE — 99285 EMERGENCY DEPT VISIT HI MDM: CPT

## 2020-04-06 RX ADMIN — SODIUM CHLORIDE 1000 ML: 0.9 INJECTION, SOLUTION INTRAVENOUS at 21:50

## 2020-04-07 VITALS
BODY MASS INDEX: 23.02 KG/M2 | RESPIRATION RATE: 18 BRPM | DIASTOLIC BLOOD PRESSURE: 72 MMHG | HEIGHT: 72 IN | WEIGHT: 169.97 LBS | TEMPERATURE: 98 F | OXYGEN SATURATION: 99 % | HEART RATE: 56 BPM | SYSTOLIC BLOOD PRESSURE: 117 MMHG

## 2020-04-07 PROBLEM — R42 DIZZINESSES: Status: ACTIVE | Noted: 2020-04-07

## 2020-04-07 LAB
ANION GAP SERPL CALCULATED.3IONS-SCNC: 8 MMOL/L (ref 5–14)
ATRIAL RATE: 70 BPM
BUN SERPL-MCNC: 16 MG/DL (ref 5–25)
CALCIUM SERPL-MCNC: 8.4 MG/DL (ref 8.4–10.2)
CHLORIDE SERPL-SCNC: 109 MMOL/L (ref 97–108)
CO2 SERPL-SCNC: 21 MMOL/L (ref 22–30)
CREAT SERPL-MCNC: 0.86 MG/DL (ref 0.7–1.5)
ERYTHROCYTE [DISTWIDTH] IN BLOOD BY AUTOMATED COUNT: 12.6 %
GFR SERPL CREATININE-BSD FRML MDRD: 91 ML/MIN/1.73SQ M
GLUCOSE SERPL-MCNC: 98 MG/DL (ref 70–99)
HCT VFR BLD AUTO: 36.5 % (ref 41–53)
HGB BLD-MCNC: 12.6 G/DL (ref 13.5–17.5)
MAGNESIUM SERPL-MCNC: 2 MG/DL (ref 1.6–2.3)
MCH RBC QN AUTO: 33.2 PG (ref 26–34)
MCHC RBC AUTO-ENTMCNC: 34.6 G/DL (ref 31–36)
MCV RBC AUTO: 96 FL (ref 80–100)
P AXIS: 36 DEGREES
PHOSPHATE SERPL-MCNC: 3.2 MG/DL (ref 2.5–4.8)
PLATELET # BLD AUTO: 114 THOUSANDS/UL (ref 150–450)
PMV BLD AUTO: 12.9 FL (ref 8.9–12.7)
POTASSIUM SERPL-SCNC: 3.9 MMOL/L (ref 3.6–5)
PR INTERVAL: 206 MS
QRS AXIS: 38 DEGREES
QRSD INTERVAL: 84 MS
QT INTERVAL: 436 MS
QTC INTERVAL: 470 MS
RBC # BLD AUTO: 3.81 MILLION/UL (ref 4.5–5.9)
SODIUM SERPL-SCNC: 138 MMOL/L (ref 137–147)
T WAVE AXIS: 19 DEGREES
TROPONIN I SERPL-MCNC: <0.01 NG/ML (ref 0–0.03)
VENTRICULAR RATE: 70 BPM
WBC # BLD AUTO: 4.5 THOUSAND/UL (ref 4.5–11)

## 2020-04-07 PROCEDURE — 84484 ASSAY OF TROPONIN QUANT: CPT | Performed by: PHYSICIAN ASSISTANT

## 2020-04-07 PROCEDURE — 99236 HOSP IP/OBS SAME DATE HI 85: CPT | Performed by: PHYSICIAN ASSISTANT

## 2020-04-07 PROCEDURE — 85027 COMPLETE CBC AUTOMATED: CPT | Performed by: PHYSICIAN ASSISTANT

## 2020-04-07 PROCEDURE — 93010 ELECTROCARDIOGRAM REPORT: CPT | Performed by: INTERNAL MEDICINE

## 2020-04-07 PROCEDURE — 99222 1ST HOSP IP/OBS MODERATE 55: CPT

## 2020-04-07 PROCEDURE — 84100 ASSAY OF PHOSPHORUS: CPT | Performed by: PHYSICIAN ASSISTANT

## 2020-04-07 PROCEDURE — 80048 BASIC METABOLIC PNL TOTAL CA: CPT | Performed by: PHYSICIAN ASSISTANT

## 2020-04-07 PROCEDURE — 83735 ASSAY OF MAGNESIUM: CPT | Performed by: PHYSICIAN ASSISTANT

## 2020-04-07 RX ORDER — ISOSORBIDE MONONITRATE 30 MG/1
30 TABLET, EXTENDED RELEASE ORAL DAILY
Status: DISCONTINUED | OUTPATIENT
Start: 2020-04-07 | End: 2020-04-07 | Stop reason: HOSPADM

## 2020-04-07 RX ORDER — LOSARTAN POTASSIUM 25 MG/1
25 TABLET ORAL DAILY
Status: DISCONTINUED | OUTPATIENT
Start: 2020-04-07 | End: 2020-04-07 | Stop reason: HOSPADM

## 2020-04-07 RX ORDER — NITROGLYCERIN 0.4 MG/1
0.4 TABLET SUBLINGUAL
Status: DISCONTINUED | OUTPATIENT
Start: 2020-04-07 | End: 2020-04-07 | Stop reason: HOSPADM

## 2020-04-07 RX ORDER — ATORVASTATIN CALCIUM 80 MG/1
80 TABLET, FILM COATED ORAL
Status: DISCONTINUED | OUTPATIENT
Start: 2020-04-07 | End: 2020-04-07 | Stop reason: HOSPADM

## 2020-04-07 RX ORDER — AMLODIPINE BESYLATE 10 MG/1
10 TABLET ORAL DAILY
Status: DISCONTINUED | OUTPATIENT
Start: 2020-04-07 | End: 2020-04-07 | Stop reason: HOSPADM

## 2020-04-07 RX ORDER — METOPROLOL SUCCINATE 50 MG/1
50 TABLET, EXTENDED RELEASE ORAL DAILY
Status: DISCONTINUED | OUTPATIENT
Start: 2020-04-07 | End: 2020-04-07 | Stop reason: HOSPADM

## 2020-04-07 RX ORDER — ASPIRIN 81 MG/1
81 TABLET, CHEWABLE ORAL DAILY
Status: DISCONTINUED | OUTPATIENT
Start: 2020-04-07 | End: 2020-04-07 | Stop reason: HOSPADM

## 2020-04-07 RX ADMIN — METOPROLOL SUCCINATE 50 MG: 50 TABLET, EXTENDED RELEASE ORAL at 08:07

## 2020-04-07 RX ADMIN — ENOXAPARIN SODIUM 40 MG: 40 INJECTION SUBCUTANEOUS at 08:07

## 2020-04-07 RX ADMIN — ISOSORBIDE MONONITRATE 30 MG: 30 TABLET, EXTENDED RELEASE ORAL at 08:07

## 2020-04-07 RX ADMIN — TICAGRELOR 90 MG: 90 TABLET ORAL at 10:31

## 2020-04-07 RX ADMIN — AMLODIPINE BESYLATE 10 MG: 10 TABLET ORAL at 08:07

## 2020-04-07 RX ADMIN — ASPIRIN 81 MG 81 MG: 81 TABLET ORAL at 08:07

## 2020-04-07 RX ADMIN — LOSARTAN POTASSIUM 25 MG: 25 TABLET, FILM COATED ORAL at 08:07

## 2020-04-13 ENCOUNTER — TRANSCRIBE ORDERS (OUTPATIENT)
Dept: ADMINISTRATIVE | Facility: HOSPITAL | Age: 65
End: 2020-04-13

## 2020-04-13 ENCOUNTER — HOSPITAL ENCOUNTER (OUTPATIENT)
Dept: RADIOLOGY | Facility: HOSPITAL | Age: 65
Discharge: HOME | End: 2020-04-13
Payer: MEDICARE

## 2020-04-13 DIAGNOSIS — M54.50 LOW BACK PAIN, UNSPECIFIED BACK PAIN LATERALITY, UNSPECIFIED CHRONICITY, UNSPECIFIED WHETHER SCIATICA PRESENT: ICD-10-CM

## 2020-04-13 DIAGNOSIS — M54.50 LOW BACK PAIN, UNSPECIFIED BACK PAIN LATERALITY, UNSPECIFIED CHRONICITY, UNSPECIFIED WHETHER SCIATICA PRESENT: Primary | ICD-10-CM

## 2020-04-13 PROCEDURE — 72110 X-RAY EXAM L-2 SPINE 4/>VWS: CPT

## 2020-04-14 ENCOUNTER — APPOINTMENT (EMERGENCY)
Dept: RADIOLOGY | Facility: HOSPITAL | Age: 65
End: 2020-04-14
Payer: MEDICARE

## 2020-04-14 ENCOUNTER — HOSPITAL ENCOUNTER (EMERGENCY)
Facility: HOSPITAL | Age: 65
Discharge: HOME | End: 2020-04-14
Attending: EMERGENCY MEDICINE | Admitting: EMERGENCY MEDICINE
Payer: MEDICARE

## 2020-04-14 VITALS
RESPIRATION RATE: 21 BRPM | HEART RATE: 61 BPM | DIASTOLIC BLOOD PRESSURE: 72 MMHG | BODY MASS INDEX: 23.87 KG/M2 | WEIGHT: 176 LBS | TEMPERATURE: 98.6 F | OXYGEN SATURATION: 98 % | SYSTOLIC BLOOD PRESSURE: 117 MMHG

## 2020-04-14 DIAGNOSIS — R07.9 CHEST PAIN, UNSPECIFIED TYPE: Primary | ICD-10-CM

## 2020-04-14 LAB
ANION GAP SERPL CALCULATED.3IONS-SCNC: 9 MMOL/L (ref 5–14)
ATRIAL RATE: 65 BPM
BASOPHILS # BLD AUTO: 0 THOUSANDS/ΜL (ref 0–0.1)
BASOPHILS NFR BLD AUTO: 1 % (ref 0–1)
BUN SERPL-MCNC: 11 MG/DL (ref 5–25)
CALCIUM SERPL-MCNC: 9.2 MG/DL (ref 8.4–10.2)
CHLORIDE SERPL-SCNC: 110 MMOL/L (ref 97–108)
CO2 SERPL-SCNC: 20 MMOL/L (ref 22–30)
CREAT SERPL-MCNC: 0.86 MG/DL (ref 0.7–1.5)
EOSINOPHIL # BLD AUTO: 0.1 THOUSAND/ΜL (ref 0–0.4)
EOSINOPHIL NFR BLD AUTO: 2 % (ref 0–6)
ERYTHROCYTE [DISTWIDTH] IN BLOOD BY AUTOMATED COUNT: 12.7 %
GFR SERPL CREATININE-BSD FRML MDRD: 91 ML/MIN/1.73SQ M
GLUCOSE SERPL-MCNC: 128 MG/DL (ref 70–99)
HCT VFR BLD AUTO: 38.5 % (ref 41–53)
HGB BLD-MCNC: 13.5 G/DL (ref 13.5–17.5)
LYMPHOCYTES # BLD AUTO: 1.5 THOUSANDS/ΜL (ref 0.5–4)
LYMPHOCYTES NFR BLD AUTO: 24 % (ref 25–45)
MCH RBC QN AUTO: 33.1 PG (ref 26–34)
MCHC RBC AUTO-ENTMCNC: 34.9 G/DL (ref 31–36)
MCV RBC AUTO: 95 FL (ref 80–100)
MONOCYTES # BLD AUTO: 0.4 THOUSAND/ΜL (ref 0.2–0.9)
MONOCYTES NFR BLD AUTO: 7 % (ref 1–10)
NEUTROPHILS # BLD AUTO: 4 THOUSANDS/ΜL (ref 1.8–7.8)
NEUTS SEG NFR BLD AUTO: 66 % (ref 45–65)
NT-PROBNP SERPL-MCNC: 131 PG/ML (ref 0–299)
P AXIS: 35 DEGREES
PLATELET # BLD AUTO: 193 THOUSANDS/UL (ref 150–450)
PMV BLD AUTO: 12.4 FL (ref 8.9–12.7)
POTASSIUM SERPL-SCNC: 3.8 MMOL/L (ref 3.6–5)
PR INTERVAL: 220 MS
QRS AXIS: 34 DEGREES
QRSD INTERVAL: 96 MS
QT INTERVAL: 406 MS
QTC INTERVAL: 422 MS
RBC # BLD AUTO: 4.06 MILLION/UL (ref 4.5–5.9)
SODIUM SERPL-SCNC: 139 MMOL/L (ref 137–147)
T WAVE AXIS: 71 DEGREES
TROPONIN I SERPL-MCNC: <0.01 NG/ML (ref 0–0.03)
TROPONIN I SERPL-MCNC: <0.01 NG/ML (ref 0–0.03)
VENTRICULAR RATE: 65 BPM
WBC # BLD AUTO: 6.1 THOUSAND/UL (ref 4.5–11)

## 2020-04-14 PROCEDURE — 93005 ELECTROCARDIOGRAM TRACING: CPT

## 2020-04-14 PROCEDURE — 85025 COMPLETE CBC W/AUTO DIFF WBC: CPT | Performed by: EMERGENCY MEDICINE

## 2020-04-14 PROCEDURE — 99282 EMERGENCY DEPT VISIT SF MDM: CPT | Performed by: EMERGENCY MEDICINE

## 2020-04-14 PROCEDURE — 71045 X-RAY EXAM CHEST 1 VIEW: CPT

## 2020-04-14 PROCEDURE — 36415 COLL VENOUS BLD VENIPUNCTURE: CPT | Performed by: EMERGENCY MEDICINE

## 2020-04-14 PROCEDURE — 93010 ELECTROCARDIOGRAM REPORT: CPT | Performed by: INTERNAL MEDICINE

## 2020-04-14 PROCEDURE — 84484 ASSAY OF TROPONIN QUANT: CPT | Performed by: EMERGENCY MEDICINE

## 2020-04-14 PROCEDURE — 83880 ASSAY OF NATRIURETIC PEPTIDE: CPT | Performed by: EMERGENCY MEDICINE

## 2020-04-14 PROCEDURE — 99285 EMERGENCY DEPT VISIT HI MDM: CPT

## 2020-04-14 PROCEDURE — 80048 BASIC METABOLIC PNL TOTAL CA: CPT | Performed by: EMERGENCY MEDICINE

## 2020-04-14 RX ORDER — SODIUM CHLORIDE 9 MG/ML
3 INJECTION INTRAVENOUS
Status: DISCONTINUED | OUTPATIENT
Start: 2020-04-14 | End: 2020-04-14 | Stop reason: HOSPADM

## 2020-04-14 RX ORDER — ASPIRIN 81 MG/1
324 TABLET, CHEWABLE ORAL ONCE
Status: DISCONTINUED | OUTPATIENT
Start: 2020-04-14 | End: 2020-04-14 | Stop reason: HOSPADM

## 2020-04-27 ENCOUNTER — CONSULT (OUTPATIENT)
Dept: CARDIOLOGY CLINIC | Facility: CLINIC | Age: 65
End: 2020-04-27
Payer: MEDICARE

## 2020-04-27 VITALS
WEIGHT: 172 LBS | BODY MASS INDEX: 23.3 KG/M2 | SYSTOLIC BLOOD PRESSURE: 122 MMHG | DIASTOLIC BLOOD PRESSURE: 70 MMHG | HEART RATE: 66 BPM | HEIGHT: 72 IN

## 2020-04-27 DIAGNOSIS — E78.2 MIXED HYPERLIPIDEMIA: ICD-10-CM

## 2020-04-27 DIAGNOSIS — I10 BENIGN ESSENTIAL HYPERTENSION: ICD-10-CM

## 2020-04-27 DIAGNOSIS — I73.9 PERIPHERAL ARTERIAL DISEASE (HCC): ICD-10-CM

## 2020-04-27 DIAGNOSIS — E11.40 CONTROLLED TYPE 2 DIABETES MELLITUS WITH DIABETIC NEUROPATHY, WITHOUT LONG-TERM CURRENT USE OF INSULIN (HCC): ICD-10-CM

## 2020-04-27 DIAGNOSIS — I21.4 NSTEMI (NON-ST ELEVATED MYOCARDIAL INFARCTION) (HCC): Primary | ICD-10-CM

## 2020-04-27 DIAGNOSIS — I70.213 ATHEROSCLEROSIS OF NATIVE ARTERY OF BOTH LOWER EXTREMITIES WITH INTERMITTENT CLAUDICATION (HCC): ICD-10-CM

## 2020-04-27 DIAGNOSIS — I25.118 CORONARY ARTERY DISEASE OF NATIVE ARTERY OF NATIVE HEART WITH STABLE ANGINA PECTORIS (HCC): ICD-10-CM

## 2020-04-27 PROCEDURE — 4040F PNEUMOC VAC/ADMIN/RCVD: CPT

## 2020-04-27 PROCEDURE — 3074F SYST BP LT 130 MM HG: CPT

## 2020-04-27 PROCEDURE — 3078F DIAST BP <80 MM HG: CPT

## 2020-04-27 PROCEDURE — 1111F DSCHRG MED/CURRENT MED MERGE: CPT

## 2020-04-27 PROCEDURE — 93000 ELECTROCARDIOGRAM COMPLETE: CPT

## 2020-04-27 PROCEDURE — 1036F TOBACCO NON-USER: CPT

## 2020-04-27 PROCEDURE — 99214 OFFICE O/P EST MOD 30 MIN: CPT

## 2020-04-27 PROCEDURE — 3044F HG A1C LEVEL LT 7.0%: CPT

## 2020-04-27 PROCEDURE — 3008F BODY MASS INDEX DOCD: CPT

## 2020-04-27 RX ORDER — RANOLAZINE 500 MG/1
500 TABLET, EXTENDED RELEASE ORAL 2 TIMES DAILY
Qty: 60 TABLET | Refills: 11 | Status: SHIPPED | OUTPATIENT
Start: 2020-04-27 | End: 2021-02-25 | Stop reason: SDUPTHER

## 2021-02-25 DIAGNOSIS — I70.213 ATHEROSCLEROSIS OF NATIVE ARTERY OF BOTH LOWER EXTREMITIES WITH INTERMITTENT CLAUDICATION (HCC): ICD-10-CM

## 2021-02-25 DIAGNOSIS — I25.118 CORONARY ARTERY DISEASE OF NATIVE ARTERY OF NATIVE HEART WITH STABLE ANGINA PECTORIS (HCC): ICD-10-CM

## 2021-02-25 RX ORDER — RANOLAZINE 500 MG/1
500 TABLET, EXTENDED RELEASE ORAL 2 TIMES DAILY
Qty: 60 TABLET | Refills: 0 | Status: SHIPPED | OUTPATIENT
Start: 2021-02-25 | End: 2021-03-22 | Stop reason: SDUPTHER

## 2021-02-25 NOTE — TELEPHONE ENCOUNTER
Fax refill request from [facility]EN)  Ranolazine ER 500mg  #60  For bubble pack  Last ov 4/6/20    Refill 30 day supply with no refills    Will have front staff call and schedule follow up appt

## 2021-03-22 ENCOUNTER — OFFICE VISIT (OUTPATIENT)
Dept: CARDIOLOGY CLINIC | Facility: CLINIC | Age: 66
End: 2021-03-22
Payer: MEDICARE

## 2021-03-22 VITALS
BODY MASS INDEX: 23.73 KG/M2 | WEIGHT: 175 LBS | SYSTOLIC BLOOD PRESSURE: 120 MMHG | DIASTOLIC BLOOD PRESSURE: 68 MMHG | HEART RATE: 54 BPM

## 2021-03-22 DIAGNOSIS — I70.213 ATHEROSCLEROSIS OF NATIVE ARTERY OF BOTH LOWER EXTREMITIES WITH INTERMITTENT CLAUDICATION (HCC): ICD-10-CM

## 2021-03-22 DIAGNOSIS — I10 BENIGN ESSENTIAL HYPERTENSION: ICD-10-CM

## 2021-03-22 DIAGNOSIS — R00.1 BRADYCARDIA, SINUS: Primary | ICD-10-CM

## 2021-03-22 DIAGNOSIS — I21.4 NSTEMI (NON-ST ELEVATED MYOCARDIAL INFARCTION) (HCC): ICD-10-CM

## 2021-03-22 DIAGNOSIS — I25.10 CORONARY ARTERY DISEASE INVOLVING NATIVE CORONARY ARTERY OF NATIVE HEART WITHOUT ANGINA PECTORIS: ICD-10-CM

## 2021-03-22 DIAGNOSIS — E11.40 CONTROLLED TYPE 2 DIABETES MELLITUS WITH DIABETIC NEUROPATHY, WITHOUT LONG-TERM CURRENT USE OF INSULIN (HCC): ICD-10-CM

## 2021-03-22 DIAGNOSIS — E78.2 MIXED HYPERLIPIDEMIA: ICD-10-CM

## 2021-03-22 DIAGNOSIS — I25.118 CORONARY ARTERY DISEASE OF NATIVE ARTERY OF NATIVE HEART WITH STABLE ANGINA PECTORIS (HCC): ICD-10-CM

## 2021-03-22 DIAGNOSIS — I73.9 PERIPHERAL ARTERIAL DISEASE (HCC): ICD-10-CM

## 2021-03-22 PROCEDURE — 99214 OFFICE O/P EST MOD 30 MIN: CPT

## 2021-03-22 PROCEDURE — 93000 ELECTROCARDIOGRAM COMPLETE: CPT

## 2021-03-22 RX ORDER — LOSARTAN POTASSIUM 100 MG/1
100 TABLET ORAL DAILY
Qty: 90 TABLET | Refills: 3 | Status: SHIPPED | OUTPATIENT
Start: 2021-03-22

## 2021-03-22 RX ORDER — METOPROLOL SUCCINATE 50 MG/1
50 TABLET, EXTENDED RELEASE ORAL DAILY
Qty: 90 TABLET | Refills: 3 | Status: SHIPPED | OUTPATIENT
Start: 2021-03-22

## 2021-03-22 RX ORDER — RANOLAZINE 500 MG/1
500 TABLET, EXTENDED RELEASE ORAL 2 TIMES DAILY
Qty: 180 TABLET | Refills: 3 | Status: SHIPPED | OUTPATIENT
Start: 2021-03-22 | End: 2021-03-25 | Stop reason: SDUPTHER

## 2021-03-22 RX ORDER — AMLODIPINE BESYLATE 10 MG/1
10 TABLET ORAL DAILY
Qty: 90 TABLET | Refills: 3 | Status: SHIPPED | OUTPATIENT
Start: 2021-03-22

## 2021-03-22 RX ORDER — ASPIRIN 81 MG/1
81 TABLET, CHEWABLE ORAL DAILY
Qty: 90 TABLET | Refills: 3 | Status: SHIPPED | OUTPATIENT
Start: 2021-03-22

## 2021-03-22 RX ORDER — ATORVASTATIN CALCIUM 80 MG/1
80 TABLET, FILM COATED ORAL DAILY
Qty: 90 TABLET | Refills: 3 | Status: SHIPPED | OUTPATIENT
Start: 2021-03-22

## 2021-03-22 RX ORDER — NITROGLYCERIN 0.4 MG/1
0.4 TABLET SUBLINGUAL
Qty: 25 TABLET | Refills: 0 | Status: SHIPPED | OUTPATIENT
Start: 2021-03-22

## 2021-03-22 RX ORDER — ISOSORBIDE MONONITRATE 30 MG/1
30 TABLET, EXTENDED RELEASE ORAL DAILY
Qty: 90 TABLET | Refills: 3 | Status: SHIPPED | OUTPATIENT
Start: 2021-03-22

## 2021-03-22 NOTE — PROGRESS NOTES
Cardiology   MD Tyrel Pollack MD Corrine Portland, DO, Kristi Branch MD Tee Gutierrez DO, Allison Perera DO, [facility] [place]  -------------------------------------------------------------------  [facility]  [address] [place] [id number]  [phone]  [phone]  03/22/21  Denny Bartlett  YOB: 1955   MRN: [id number]      Referring Physician: IESHA Somers  [address]     HPI: Kellie Zuleta is a 76 y o  male with severe coronary artery disease status post recent cardiac catheterization on March 31, 2020 which showed the following:  CORONARY CIRCULATION:  Distal left main: There was a 50 % stenosis  Proximal LAD: There was a 100 % stenosis  The LAD fills via LIMA to diagonal branch  1st diagonal: There was a 80 % stenosis proximal to the graft anastomosis  1st obtuse marginal: There was probably a chronic 100 % stenosis  This lesion is a chronic total occlusion  Very small vessel  Proximal RCA: There was a 100 % stenosis  Graft to the RPDA: There was a 100 % stenosis at the proximal anastomosis      He has had coronary artery bypass graft surgery in 2009 and also has PAD, diabetes type 2, hypertension  He presents today for follow-up  He states he is feeling well  Denies any angina symptoms at all today  Spoke to him with the help of a   Denies shortness of breath, excessive dyspnea on exertion, palpitations syncope pre syncope paroxysmal nocturnal dyspnea or diaphoresis    Review of Systems   Constitutional: Negative for chills and fever  HENT: Negative for facial swelling and sore throat  Eyes: Negative for visual disturbance  Respiratory: Negative for cough, chest tightness, shortness of breath and wheezing  Cardiovascular: Negative for chest pain, palpitations and leg swelling     Gastrointestinal: Negative for abdominal pain, blood in stool, constipation, diarrhea, nausea and vomiting  Endocrine: Negative for cold intolerance and heat intolerance  Genitourinary: Negative for decreased urine volume, difficulty urinating, dysuria and hematuria  Musculoskeletal: Negative for arthralgias, back pain and myalgias  Skin: Negative for rash  Neurological: Negative for dizziness, syncope, weakness and numbness  Psychiatric/Behavioral: Negative for agitation, behavioral problems and confusion  The patient is not nervous/anxious  OBJECTIVE  Vitals:    03/22/21 1500   BP: 120/68   Pulse: (!) 54       Physical Exam    General appearance: alert and oriented, in no acute distress  Head: Normocephalic, without obvious abnormality, atraumatic  Eyes: conjunctivae/corneas clear  Anicteric  Neck: no adenopathy, no carotid bruit, no JVD  Lungs: clear to auscultation bilaterally  Heart: regular rate and rhythm, S1, S2 normal, no murmur, no click, rub or gallop  Abdomen:  soft, non-tender; bowel sounds normal; no masses,  no organomegaly  Extremities: extremities normal, warm and well-perfused; no cyanosis, clubbing, or edema  Skin: Skin color, texture, turgor normal  No rashes or lesions   EKG:  Results for orders placed or performed in visit on 03/22/21   POCT ECG    Impression     Sinus bradycardia first-degree AV block minimal voltage criteria for LVH, ST T-wave abnormalities consider anterolateral ischemia        IMPRESSION:  1  Severe coronary artery disease   1  cardiac catheterization on March 31, 2020 which showed the following:  CORONARY CIRCULATION:  Distal left main: There was a 50 % stenosis  Proximal LAD: There was a 100 % stenosis  The LAD fills via LIMA to diagonal branch  1st diagonal: There was a 80 % stenosis proximal to the graft anastomosis  1st obtuse marginal: There was probably a chronic 100 % stenosis  This lesion is a chronic total occlusion  Very small vessel  Proximal RCA: There was a 100 % stenosis    Graft to the RPDA: There was a 100 % stenosis at the proximal anastomosis      3  Preserved ejection fraction of 50-55%  4  Type 2 diabetes  5  Hypertension  6  Dyslipidemia    DISCUSSION/RECOMMENDATIONS:    at this time he is stable    His blood pressure is controlled   He is having no angina symptoms    We will continue his aspirin, Lipitor 80, amlodipine 10, Imdur 30, losartan 100 mg, metoprolol XL 50 mg, sublingual nitro, Brilinta 90 mg and Ranexa 500 mg b i d  without changes at this time    Plan for follow-up 1 year with Cardiology    Kerri Rubio DO, [facility] [place]  --------------------------------------------------------------------------------  TREADMILL STRESS  No results found for this or any previous visit    ----------------------------------------------------------------------------------------------  NUCLEAR STRESS TEST: No results found for this or any previous visit  No results found for this or any previous visit     --------------------------------------------------------------------------------  CATH:    Results for orders placed during the hospital encounter of 20   Cardiac catheterization    Narrative Jaqui [address] [place] [address]  [phone]    [facility]    Invasive Cardiovascular Lab Complete Report    Patient: Grayson Perez  MR number: [id number]  Account number: [de-identified]  Study date: 2020  Gender: Male  : 1955  Height: 72 in  Weight: 176 lb  BSA: 2 02 mï¾²    Allergies: NO KNOWN ALLERGIES    Diagnostic Cardiologist:  Ayad Baugh DO  Primary Physician:  Trisha Osei    SUMMARY    CORONARY CIRCULATION:  Distal left main: There was a 50 % stenosis  Proximal LAD: There was a 100 % stenosis  The LAD fills via LIMA to diagonal branch  1st diagonal: There was a 80 % stenosis proximal to the graft anastomosis  1st obtuse marginal: There was probably a chronic 100 % stenosis  This lesion is a chronic total occlusion   Very small vessel  Proximal RCA: There was a 100 % stenosis  Graft to the RPDA: There was a 100 % stenosis at the proximal anastomosis  INDICATIONS:  --  Possible CAD: unstable angina  PROCEDURES PERFORMED    --  Right coronary angiography  --  Left coronary angiography  --  Saphenous vein graft angiography  --  LIMA graft angiography  --  Arch aortography with cardiac catheterization  --  Inpatient  --  Mod Sedation Same Physician Initial 15min  --  Coronary Catheterization (w/o LHC, w/ Grafts  --  Aortography w/ Cardiac Cath (Supravalvular)  PROCEDURE: The risks and alternatives of the procedures and conscious sedation were explained to the patient and informed consent was obtained  The patient was brought to the cath lab and placed on the table  The planned puncture sites  were prepped and draped in the usual sterile fashion  --  Right radial artery access  After performing an Kavon's test to verify adequate ulnar artery supply to the hand, the radial site was prepped  The puncture site was infiltrated with local anesthetic  The vessel was accessed using the  modified Seldinger technique, a wire was advanced into the vessel, and a sheath was advanced over the wire into the vessel  --  Right coronary artery angiography  A catheter was advanced over a guidewire into the aorta and positioned in the right coronary artery ostium under fluoroscopic guidance  Angiography was performed  --  Left coronary artery angiography  A catheter was advanced over a guidewire into the aorta and positioned in the left coronary artery ostium under fluoroscopic guidance  Angiography was performed  --  Saphenous vein graft angiography  A catheter was advanced to the aorta and positioned at the aortic anastomosis of the graft over a guidewire under fluoroscopic guidance  Angiography was performed  --  Left internal mammary graft angiography   A catheter was advanced to the aorta and positioned in the left subclavian artery over a guidewire under fluoroscopic guidance  Angiography was performed  --  Arch aortography with cardiac catheterization  A catheter was positioned into the ascending aorta over a guide wire under fluoroscopic guidance and contrast was injected  Angiography was performed  --  Inpatient  --  Mod Sedation Same Physician Initial 15min  --  Coronary Catheterization (w/o LHC, w/ Grafts  --  Aortography w/ Cardiac Cath (Supravalvular)  PROCEDURE COMPLETION: The patient tolerated the procedure well and was discharged from the cath lab  TIMING: Test started at 12:47  Test concluded at 13:21  HEMOSTASIS: The sheath was removed  The site was compressed with a Hemoband  device  Hemostasis was obtained  MEDICATIONS GIVEN: Fentanyl (1OOmcg/2 ml), 50 mcg, IV, at 12:47  Versed (2mg/2ml), 2 mg, IV, at 12:47  1% Lidocaine, 1 ml, subcutaneously, at 12:48  Heparin 1000 units/ml, 4,000 units, IV, at 12:54  Verapamil (5mg/2ml), 2 5 mg, IV, at 12:54  Nitroglycerin (200mcg/ml), 200 mcg, at 12:54  CONTRAST GIVEN: 60 ml Omnipaque (350mg I /ml)  60 ml Omnipaque (350mg I /ml)  RADIATION EXPOSURE: Fluoroscopy time: 12 57 min  CORONARY VESSELS:   --  The coronary circulation is co-dominant  --  Distal left main: There was a 50 % stenosis  --  Proximal LAD: There was a 100 % stenosis  The LAD fills via LIMA to diagonal branch  --  1st diagonal: There was a 80 % stenosis proximal to the graft anastomosis  --  1st obtuse marginal: The vessel was small sized  There was probably a chronic 100 % stenosis  This lesion is a chronic total occlusion  Very small vessel  --  Proximal RCA: There was a 100 % stenosis  --  Graft to the 1st diagonal: The graft was a LIMA  Graft angiography showed no evidence of disease  --  Graft to the RPDA: The graft was a saphenous vein graft from the aorta  There was a 100 % stenosis at the proximal anastomosis      IMPRESSIONS:  Patent LIMA to diagonal which then fills the entire LAD  SVG's to presumed codominant RCA occluded  No graft seen to circumflex  Native circumflex probably codominant with no obvious significant OM branches seen  RECOMMENDATIONS  Goal directed med rx cad recommended  DISPOSITION:  The patient left the catheterization laboratory in stable condition  Prepared and signed by    Shirin Helm DO  Signed 2020 13:36:56    Study diagram    Angiographic findings  Native coronary lesions:  ï¾·Distal left main: Lesion 1: 50 % stenosis  ï¾·Proximal LAD: Lesion 1: 100 % stenosis  ï¾·D1: Lesion 1: 80 % stenosis  ï¾·OM1: Lesion 1: 100 % stenosis  ï¾·Proximal RCA: Lesion 1: 100 % stenosis  Coronary graft lesions:  ï¾·Graft to RPDA: SVG  ï¾· 100 % stenosis at proximal anastomosis  Hemodynamic tables    Pressures:  Baseline  Pressures:  - HR: 83  Pressures:  - Rhythm:  Pressures:  -- Aortic Pressure (S/D/M): 130/78/96    Outputs:  Baseline  Outputs:  -- CALCULATIONS: [age]  Outputs:  -- CALCULATIONS: Body Surface Area: 2 02  Outputs:  -- CALCULATIONS: Height in cm: 183 00  Outputs:  -- CALCULATIONS: Sex: Male  Outputs:  -- CALCULATIONS: Weight in k 00       --------------------------------------------------------------------------------  ECHO:   Results for orders placed during the hospital encounter of 20   Echo complete with contrast if indicated    Narrative Jaqui [address]  [phone]    Transthoracic Echocardiogram  2D, M-mode, Doppler, and Color Doppler    Study date:  31-Mar-2020    Patient: Cecily Aaron  MR number: [id number]  Account number: [de-identified]  : 1955  Age: 71 years  Gender: Male  Status: Inpatient  Location: Bedside  Height: 72 in  Weight: 176 7 lb  BP: 140/ 70 mmHg    Indications: Coronary artery disease      Diagnoses: I25 119 - Atherosclerotic heart disease of native coronary artery with unspecified angina pectoris    Sonographer:  Shari Hunt RDCS  Primary Physician:  Zbigniew Arrington, [address]  Referring Physician:  Sabas Perry MD  Group:  [facility]  Interpreting Physician:  Polo Chavez MD    SUMMARY    LEFT VENTRICLE:  Systolic function was at the lower limits of normal  Ejection fraction was estimated to be 50 % - 55%  There was hypokinesis of the mid inferoseptal, basal-mid inferior, and basal-mid inferolateral wall(s)  Wall thickness was increased  Hypertrophy was noted  Doppler parameters were consistent with abnormal left ventricular relaxation (grade 1 diastolic dysfunction)  RIGHT VENTRICLE:  The size was at the upper limits of normal   Systolic function was normal     HISTORY: PRIOR HISTORY: Hyperlipidemia, coronary artery disease with stable angina pectoris, non-ST elevation MI  PROCEDURE: The procedure was performed at the bedside  This was a routine study  The transthoracic approach was used  The study included complete 2D imaging, M-mode, complete spectral Doppler, and color Doppler  The heart rate was 73 bpm,  at the start of the study  Images were obtained from the parasternal, apical, subcostal, and suprasternal notch acoustic windows  Echocardiographic views were limited due to poor acoustic window availability  Image quality was adequate  LEFT VENTRICLE: Size was normal  Systolic function was at the lower limits of normal  Ejection fraction was estimated to be 50 % - 55%  There was hypokinesis of the mid inferoseptal, basal-mid inferior, and basal-mid inferolateral wall(s)  Wall thickness was increased  Hypertrophy was noted  DOPPLER: There was an increased relative contribution of atrial contraction to ventricular filling  Doppler parameters were consistent with abnormal left ventricular relaxation (grade 1  diastolic dysfunction)      RIGHT VENTRICLE: The size was at the upper limits of normal  Systolic function was normal     LEFT ATRIUM: Size was normal     RIGHT ATRIUM: Size was normal     MITRAL VALVE: Valve structure was normal  There was normal leaflet separation  DOPPLER: There was no evidence for stenosis  There was no significant regurgitation  AORTIC VALVE: The valve was trileaflet  Leaflets exhibited normal thickness and normal cuspal separation  DOPPLER: There was no evidence for stenosis  There was no regurgitation  TRICUSPID VALVE: The valve structure was normal  There was normal leaflet separation  DOPPLER: There was no evidence for stenosis  There was trace regurgitation  PULMONIC VALVE: Leaflets exhibited normal thickness, no calcification, and normal cuspal separation  DOPPLER: The transpulmonic velocity was within the normal range  There was trace regurgitation  PERICARDIUM: There was no pericardial effusion  The pericardium was normal in appearance  AORTA: The root exhibited normal size      SYSTEM MEASUREMENT TABLES    2D  Ao Diam: 3 57 cm  LA Diam: 3 2 cm  LAAs A2C: 13 78 cm2  LAAs A4C: 11 69 cm2  LAESV A-L A2C: 41 98 ml  LAESV A-L A4C: 34 52 ml  LAESV Index (A-L): 20 14 ml/m2  LAESV MOD A2C: 40 78 ml  LAESV MOD A4C: 32 95 ml  LAESV(A-L): 40 69 ml  LAESV(MOD BP): 39 16 ml  LALs A2C: 3 84 cm  LALs A4C: 3 36 cm  LVEDV MOD A4C: 121 74 ml  LVEF MOD A4C: 53 59 %  LVESV MOD A4C: 56 5 ml  LVLd A4C: 7 39 cm  LVLs A4C: 5 76 cm  RAAs: 6 88 cm2  RAESV A-L: 13 93 ml  RAESV MOD: 13 24 ml  RALs: 2 88 cm  RVIDd: 2 06 cm  SV MOD A4C: 65 24 ml    MM  TAPSE: 1 72 cm    PW  MV A Holland: 0 65 m/s  MV Dec Russell: 1 36 m/s2  MV DecT: 321 58 ms  MV E Holland: 0 44 m/s  MV E/A Ratio: 0 67  MV PHT: 93 26 ms  MVA By PHT: 2 36 cm2    [employer] Accredited Echocardiography Laboratory    Prepared and electronically signed by    Quyen Will MD  Signed 31-Mar-2020 20:14:50       No results found for this or any previous visit   --------------------------------------------------------------------------------  HOLTER  No results found for this or any previous visit    No results found for this or any previous visit   --------------------------------------------------------------------------------  CAROTIDS  No results found for this or any previous visit    --------------------------------------------------------------------------------  Diagnoses and all orders for this visit:    Bradycardia, sinus  -     POCT ECG       ======================================================    Past Medical History:   Diagnosis Date    Heart disease     Hyperlipidemia     Hypertension      Past Surgical History:   Procedure Laterality Date    CARDIAC SURGERY      CORONARY ARTERY BYPASS GRAFT  2009         Medications  Current Outpatient Medications   Medication Sig Dispense Refill    amLODIPine (NORVASC) 10 mg tablet Take 10 mg by mouth daily      aspirin 81 MG tablet Take 1 tablet (81 mg total) by mouth daily 90 tablet 0    atorvastatin (LIPITOR) 80 mg tablet Take 1 tablet (80 mg total) by mouth daily 30 tablet 0    Blood Glucose Monitoring Suppl (FREESTYLE FREEDOM LITE) w/Device KIT by Does not apply route      Blood Pressure KIT by Does not apply route      isosorbide mononitrate (IMDUR) 30 mg 24 hr tablet Take 1 tablet (30 mg total) by mouth daily 30 tablet 0    Lancets (FREESTYLE) lancets by Does not apply route daily      losartan (COZAAR) 25 mg tablet Take 1 tablet (25 mg total) by mouth daily (Patient taking differently: Take 100 mg by mouth daily ) 30 tablet 0    metoprolol succinate (TOPROL-XL) 50 mg 24 hr tablet Take 1 tablet (50 mg total) by mouth daily 30 tablet 2    nitroglycerin (NITROSTAT) 0 4 mg SL tablet Place 1 tablet (0 4 mg total) under the tongue every 5 (five) minutes as needed for chest pain 25 tablet 0    ranolazine (RANEXA) 500 mg 12 hr tablet Take 1 tablet (500 mg total) by mouth 2 (two) times a day 60 tablet 0    ticagrelor (BRILINTA) 90 MG Take 1 tablet (90 mg total) by mouth every 12 (twelve) hours 90 tablet 7     No current facility-administered medications for this visit           No Known Allergies    Social History     Socioeconomic History    Marital status: Single     Spouse name: Not on file    Number of children: Not on file    Years of education: Not on file    Highest education level: Not on file   Occupational History    Not on file   Social Needs    Financial resource strain: Not on file    Food insecurity     Worry: Not on file     Inability: Not on file    Transportation needs     Medical: Not on file     Non-medical: Not on file   Tobacco Use    Smoking status: Never Smoker    Smokeless tobacco: Never Used   Substance and Sexual Activity    Alcohol use: Never     Frequency: Never    Drug use: No    Sexual activity: Not on file   Lifestyle    Physical activity     Days per week: Not on file     Minutes per session: Not on file    Stress: Not on file   Relationships    Social connections     Talks on phone: Not on file     Gets together: Not on file     Attends Sikh service: Not on file     Active member of club or organization: Not on file     Attends meetings of clubs or organizations: Not on file     Relationship status: Not on file    Intimate partner violence     Fear of current or ex partner: Not on file     Emotionally abused: Not on file     Physically abused: Not on file     Forced sexual activity: Not on file   Other Topics Concern    Not on file   Social History Narrative    Drinks coffee        INADEQUATE EXERCISE    LIVES WITH ADULT CHILDREN             Family History   Problem Relation Age of Onset    Diabetes Mother     Heart disease Mother     Diabetes Father     Heart disease Father     Hypertension Brother     Mental illness Family         DISORDER       Lab Results   Component Value Date    WBC 6 10 04/14/2020    HGB 13 5 04/14/2020    HCT 38 5 (L) 04/14/2020    MCV 95 04/14/2020     04/14/2020      Lab Results   Component Value Date    SODIUM 139 04/14/2020    K 3 8 04/14/2020     (H) 04/14/2020    CO2 20 (L) 04/14/2020    BUN 11 04/14/2020    CREATININE 0 86 04/14/2020    GLUC 128 (H) 04/14/2020    CALCIUM 9 2 04/14/2020      Lab Results   Component Value Date    HGBA1C 5 8 (H) 03/31/2020      No results found for: CHOL  Lab Results   Component Value Date    HDL 31 (L) 03/31/2020    HDL 28 (L) 10/16/2018    HDL 25 (L) 06/06/2018     Lab Results   Component Value Date    LDLCALC 45 03/31/2020    [address] 29 10/16/2018    [address] 9 06/06/2018     Lab Results   Component Value Date    TRIG 220 (H) 03/31/2020    TRIG 258 (H) 10/16/2018    TRIG 315 (H) 06/06/2018     No results found for: [place], [place]   Lab Results   Component Value Date    INR 1 16 04/06/2020    INR 1 20 (H) 03/30/2020    PROTIME 14 2 04/06/2020    PROTIME 14 6 (H) 03/30/2020          Patient Active Problem List    Diagnosis Date Noted    Dizzinesses 04/07/2020     Priority: Low    [facility]STEM[facility] (non-ST elevated [facility] infarction) [facility] 03/31/2020     Priority: Low    Thrombocytopenia ([facility] ) 03/31/2020     Priority: Low    Foot pain, left 10/30/2019     Priority: Low    Elevated bilirubin 06/07/2018     Priority: Low    Elevated alkaline phosphatase level 06/07/2018     Priority: Low    Mixed hyperlipidemia 02/02/2018     Priority: Low    Atherosclerosis of native artery of both lower extremities with intermittent claudication ([facility] ) 09/28/2017     Priority: Low    Peripheral arterial disease ([facility] ) 09/25/2017     Priority: Low    Severe carpal tunnel syndrome of right wrist 09/13/2017     Priority: Low    Elevated total protein 08/24/2017     Priority: Low    Controlled type 2 diabetes mellitus with diabetic neuropathy, without long-term current use of insulin ([facility] ) 06/16/2017     Priority: Low    Other constipation 05/10/2017     Priority: Low    Benign essential hypertension 05/04/2017     Priority: Low    Coronary artery disease of native artery of native heart with stable angina pectoris ([facility] ) 05/04/2017     Priority: Low    Cervical spondylolysis 05/04/2017     Priority: Low    Incomplete emptying of bladder 05/04/2017     Priority: Low    Radicular pain 05/04/2017     Priority: Low    Seasonal allergies 05/04/2017     Priority: Low       Portions of the record may have been created with voice recognition software  Occasional wrong word or "sound a like" substitutions may have occurred due to the inherent limitations of voice recognition software  Read the chart carefully and recognize, using context, where substitutions have occurred          Kennedy Murphy DO, [facility] [place]  3/22/2021 3:13 PM

## 2021-03-25 DIAGNOSIS — I25.118 CORONARY ARTERY DISEASE OF NATIVE ARTERY OF NATIVE HEART WITH STABLE ANGINA PECTORIS (HCC): ICD-10-CM

## 2021-03-25 DIAGNOSIS — I70.213 ATHEROSCLEROSIS OF NATIVE ARTERY OF BOTH LOWER EXTREMITIES WITH INTERMITTENT CLAUDICATION (HCC): ICD-10-CM

## 2021-03-25 RX ORDER — RANOLAZINE 500 MG/1
500 TABLET, EXTENDED RELEASE ORAL 2 TIMES DAILY
Qty: 60 TABLET | Refills: 11 | Status: SHIPPED | OUTPATIENT
Start: 2021-03-25

## 2021-03-25 NOTE — TELEPHONE ENCOUNTER
Received fax from [facility] to renew Ranolazine ER 500mg tablet for bubble packs  Phone call to patient using Ukrainian phone  to verify which pharm he uses  Dr Urvashi Llamas had forwarded script to [place] when patient seen in office 3/22    Through   Patient verifies wants scripts to [facility]  Removed [facility] and [facility] from his profile

## 2021-05-04 ENCOUNTER — TELEPHONE (OUTPATIENT)
Dept: ADMINISTRATIVE | Facility: HOSPITAL | Age: 66
End: 2021-05-04

## 2021-05-04 NOTE — TELEPHONE ENCOUNTER
Called patient to r/s left message - Appt Cancelled     From: Mackenzie Reyes <[email or web address]   Sent: Tuesday, May 4, 2021 10:16 AM  To: Meka Crabtree <[email or web address]; Dee Jacobs <[email or web address]; Emmy Meneses <[email or web address]  Subject: Otila Cords  1955    He has an appt to see [facility] on  tomorrow and his dopplers that were supposed to be done on  were cancelled

## 2021-06-09 ENCOUNTER — APPOINTMENT (OUTPATIENT)
Dept: LAB | Facility: HOSPITAL | Age: 66
End: 2021-06-09
Payer: MEDICARE

## 2021-06-09 ENCOUNTER — TRANSCRIBE ORDERS (OUTPATIENT)
Dept: LAB | Facility: HOSPITAL | Age: 66
End: 2021-06-09

## 2021-06-09 DIAGNOSIS — Z13.228 ENCOUNTER FOR SCREENING FOR METABOLIC DISORDER: Primary | ICD-10-CM

## 2021-06-09 LAB
ALBUMIN SERPL BCP-MCNC: 3.9 G/DL (ref 3–5.2)
ALP SERPL-CCNC: 114 U/L (ref 43–122)
ALT SERPL W P-5'-P-CCNC: 35 U/L
ANION GAP SERPL CALCULATED.3IONS-SCNC: 9 MMOL/L (ref 5–14)
AST SERPL W P-5'-P-CCNC: 31 U/L (ref 17–59)
BASOPHILS # BLD AUTO: 0 THOUSANDS/ΜL (ref 0–0.1)
BASOPHILS NFR BLD AUTO: 1 % (ref 0–1)
BILIRUB SERPL-MCNC: 0.9 MG/DL
BUN SERPL-MCNC: 16 MG/DL (ref 5–25)
CALCIUM SERPL-MCNC: 9.5 MG/DL (ref 8.4–10.2)
CHLORIDE SERPL-SCNC: 106 MMOL/L (ref 97–108)
CHOLEST SERPL-MCNC: 156 MG/DL
CO2 SERPL-SCNC: 22 MMOL/L (ref 22–30)
CREAT SERPL-MCNC: 0.87 MG/DL (ref 0.7–1.5)
EOSINOPHIL # BLD AUTO: 0.2 THOUSAND/ΜL (ref 0–0.4)
EOSINOPHIL NFR BLD AUTO: 3 % (ref 0–6)
ERYTHROCYTE [DISTWIDTH] IN BLOOD BY AUTOMATED COUNT: 12.6 %
EST. AVERAGE GLUCOSE BLD GHB EST-MCNC: 117 MG/DL
GFR SERPL CREATININE-BSD FRML MDRD: 90 ML/MIN/1.73SQ M
GLUCOSE P FAST SERPL-MCNC: 153 MG/DL (ref 70–99)
HBA1C MFR BLD: 5.7 %
HCT VFR BLD AUTO: 42 % (ref 41–53)
HDLC SERPL-MCNC: 23 MG/DL
HGB BLD-MCNC: 14.6 G/DL (ref 13.5–17.5)
LYMPHOCYTES # BLD AUTO: 1.8 THOUSANDS/ΜL (ref 0.5–4)
LYMPHOCYTES NFR BLD AUTO: 30 % (ref 25–45)
MCH RBC QN AUTO: 33.4 PG (ref 26–34)
MCHC RBC AUTO-ENTMCNC: 34.8 G/DL (ref 31–36)
MCV RBC AUTO: 96 FL (ref 80–100)
MONOCYTES # BLD AUTO: 0.5 THOUSAND/ΜL (ref 0.2–0.9)
MONOCYTES NFR BLD AUTO: 8 % (ref 1–10)
NEUTROPHILS # BLD AUTO: 3.4 THOUSANDS/ΜL (ref 1.8–7.8)
NEUTS SEG NFR BLD AUTO: 58 % (ref 45–65)
NONHDLC SERPL-MCNC: 133 MG/DL
PLATELET # BLD AUTO: 125 THOUSANDS/UL (ref 150–450)
PMV BLD AUTO: 12 FL (ref 8.9–12.7)
POTASSIUM SERPL-SCNC: 4.2 MMOL/L (ref 3.6–5)
PROT SERPL-MCNC: 7.6 G/DL (ref 5.9–8.4)
RBC # BLD AUTO: 4.38 MILLION/UL (ref 4.5–5.9)
SODIUM SERPL-SCNC: 137 MMOL/L (ref 137–147)
T4 FREE SERPL-MCNC: 0.86 NG/DL (ref 0.76–1.46)
TRIGL SERPL-MCNC: 773 MG/DL
TSH SERPL DL<=0.05 MIU/L-ACNC: 2.33 UIU/ML (ref 0.47–4.68)
WBC # BLD AUTO: 5.9 THOUSAND/UL (ref 4.5–11)

## 2021-06-09 PROCEDURE — 36415 COLL VENOUS BLD VENIPUNCTURE: CPT

## 2021-06-09 PROCEDURE — 80061 LIPID PANEL: CPT

## 2021-06-09 PROCEDURE — 85025 COMPLETE CBC W/AUTO DIFF WBC: CPT

## 2021-06-09 PROCEDURE — 84439 ASSAY OF FREE THYROXINE: CPT

## 2021-06-09 PROCEDURE — 80053 COMPREHEN METABOLIC PANEL: CPT

## 2021-06-09 PROCEDURE — 84443 ASSAY THYROID STIM HORMONE: CPT

## 2021-06-09 PROCEDURE — 83036 HEMOGLOBIN GLYCOSYLATED A1C: CPT

## 2023-09-24 NOTE — PATIENT INSTRUCTIONS
1  Increase losartan to 100mg once a day  2  Low salt diet! 3  Return for blood pressure re-check with nurse next week    Plan de alimentación con "enfoque dietético para detener la hipertensión (DASH, por ivet siglas en inglés)   CUIDADO AMBULATORIO:   El plan de alimentación DASH (enfoques dietéticos para detener la hipertensión)  está diseñado para ayudar a prevenir o disminuir la presión arterial carolee  También puede ayudar a bajar el colesterol blake (colesterol LDL) y disminuír rome riesgo de enfermedad cardíaca  El plan es bajo en sodio, azúcar, grasas dañinas, y grasas en rome totalidad  Es alto en potasio, calcio, magnesio y Elina  Estos nutrientes se agregan al consumir más frutas, vegetales y granos enteros  [place] límite de sodio cada día: [place] dietista le indicará la cantidad de sodio que usted debe consumir a diario  La gente que tiene la presión arterial carolee debe consumir de 1,500 a 2,300 mg de sodio al día vashti rakesh  Sanjana cucharadita (cdta) de sal tiene 2,300 mg de sodio  [place] puede parecer vashti sanjana meta difícil, forrest pequeños cambios en los alimentos que usted consume pueden hacer sanjana gran diferencia  [place] médico o dietista puede ayudarlo a crear un plan alimenticio que cumpla rome límite de sodio  Formas de limitar el sodio:   · Celsa las etiquetas de los alimentos  Las etiquetas pueden ayudarle a escoger alimentos bajos en sodio  La cantidad de sodio está incluida en miligramos (mg)  La columna del porcentaje de valor diario indica la cantidad de necesidades diarias satisfechas con 1 porción del alimento para cada nutriente en la lista  Escoja alimentos que tengan menos de 5% del porcentaje diario de Rud[place]  Estos alimentos se consideran bajos en sodio  Los alimentos que tienen 20% o más del porcentaje diario de sodio se consideran alimentos altos en sodio  Evite alimentos que tengan más de 300 mg de sodio por porción   Escoja alimentos Kalli Kailua diga que son bajos en sodio, con sodio reducido, o sin sal agregada  · Evite la sal   No le agregue sal a la comida cuando se siente a la palm a comer, y agregue muy poca sal a los alimentos lacey la cocción  Use hierbas y condimentos, vashti cebollas, ajo y especias sin sal para agregar sabor a los alimentos  Use jugo de lima, kecia o vinagre para darle a los alimentos un sabor ácido  Use chiles picantes o sanjana cantidad pequeña de salsa picante para agregar un sabor picante a los alimentos  · Pregunte sobre los sustitutos para la sal   Pregúntele a rome médico si es posible usar sustitutos de la sal  Algunos sustitutos de la sal vienen con ingredientes que pueden ser dañinos si usted tiene ciertos padecimientos médicos  · Escoja los alimentos cuidadosamente cuando sale a comer a restaurantes:  las comidas de los restaurantes, sobre todo restaurantes de comida rápida, neal siempre son altas en sodio  Algunos restaurantes ofrecen información nutricional que indica la cantidad de sodio en ivet alimentos  Pida que preparen ivet comidas con menos sal o sin sal   Lo que necesita saber acerca de las grasas:   · Incluya grasas saludables  Las grasas insaturadas y los ácidos grasos omega-3 son ejemplos de grasas saludables  Las grasas insaturadas se encuentran en los aceites de soja, [place], [place] o [place] y la margarina [facility] y [facility]  Los ácidos grasos [place] 3 se encuentran en el pescado con grasa, vashti el salmón, el atún, la caballa y las leida  También se le puede encontrar en el aceita de linaza y en la linaza molida  · Evite las grasas insalubres  No consuma grasas dañinas, vashti grasas saturadas y grasas trans  Las grasas saturadas se encuentran en alimentos que contienen grasa animal  Charmian Snowball son Merla Krishnamurthy grasosas, la [place], la [place], la crema y otros productos lácteos   Además se pueden encontrar en la [place], la margarina en sherly, el aceite de kenadll y el aceite de cris  Las grasas trans se encuentran en comidas fritas, galletas estilo soda, tortillitas tostadas, y alimentos horneados hechos con [place] o [place]  Alimentos que puede incluir:  Con el plan de alimentación DASH, usted necesita consumir un número específico de porciones de cada isael de alimentos  [place] le ayudará a consumir las cantidades suficientes de ciertos nutrientes y limitar otros  La cantidad de porciones que usted debe comer depende de la cantidad de calorías que usted necesita  Esteban dietista le informará sobre cuántas calorías necesita usted  El número de porciones que viene en la lista al lado de los grupos alimenticios a continuación es para las personas que necesitan aproximadamente 2,000 calorías al día    · Granos:  6 a 8 porciones (3 de estas porciones deben ser de alimentos de granos enteros o integrales)    ¨ 1 tajada de pan integral     ¨ 1 onza de cereal seco    ¨ ½ taza de cereal cocinado, pasta, o arroz integral    · Verduras y frutas:  4 a 5 porciones de frutas y 4 a 5 porciones de vegetales    ¨ 1 fruta mediana    ¨ ½ taza de vegetales congelados, enlatados (sin sal adicional), o vegetales frescos y picados     ¨ ½ taza de fruta fresca, congelada, seca o enlatada (enlatada en sirope liviano o jugo de fruta)    ¨ ½ taza de jugo de verduras o frutas    · Productos lácteos:  2 a 3 porciones    ¨ 1 taza de [employer] o leche 1%    ¨ 1½ onzas de queso descremado o bajo en grasas y bajo en sodio    ¨ 6 onzas de yogurt descremado o bajo en grasas    · Miley atkinson, miley kailey y pescado magros:  6 onzas o menos    ¨ Miley kailey (ronnie, pavo) sin piel    ¨ Pescado (sobre todo pescado con grasa, vashti salmón, atún fresco o APPLE)    ¨ Carne de res o de cerdo magra (london, elena carroll extra [place])    ¨ Claras de huevo y sustitutos del huevo    · [place] du sac, semillas y legumbres:  4 a 5 porciones por semana    ¨ ½ taza de frijoles y arbejas cocinadas    ¨ 1½ onzas de nueces sin sal    ¨ 2 cucharadas de mantequilla de maní o semillas    · Dulces y azúcares adicionales:  5 o menos por semana    ¨ 1 cucharada de azúcar, jalea o mermelada    ¨ ½ taza de sorbeto o gelatina    ¨ 1 taza de limonada    · Grasas:  2 a 3 porciones por semana    ¨ 1 cucharadita de margarina suave o aceite vegetal    ¨ 1 cucharada de [place]    ¨ 2 cucharadas de aderezo para ensaladas  Alimentos que se deben evitar:   · Granos:      ¨ Postres horneados o fritos vashti donas, pastelitos, galletas, y quequitos (altos en grasas y azúcar)    ¨ Mezclas para hacer pan de maíz y pasteles, alimentos empacados, vashti rellenos para pavo, mezclas para hacer arroz y pasta, macarrones con [place], y cereales instantáneos (altos en sodio)    · Frutas y verduras:      ¨ Vegetales regulares enlatados (altos en sodio)    ¨ Repollo preparado en vinagre, vegetales en vinagre, y otros alimentos preparados en escabeche (altos en sodio)    ¨ Vegetales fritos o vegetales en mantequilla o salsas altas en grasas    ¨ Frutas en crema o salsa de mantequilla (altas en grasas)    · Productos lácteos:      ¨ Leche entera, leche semi descremada (2%), y crema (carolee en grasas)    ¨ Queso regular y queso procesado (alto en grasa y sodio)    · Miley y alimentos con proteínas:      ¨ Miley ahumadas o curadas, vashti carne de suzy en conserva, tocino, jamón, perros calientes, y salchicha (carolee en grasas y sodio)    ¨ Frijoles enlatados y miley enlatadas o miley en pasta, vashti miley en conserva, leida, anchoas y [address] de imitación (altos en sodio)    ¨ Miley para emparedados, vashti [place], [place], [place], y carnchirag de res en rebanada (altos en sodio)    ¨ Miley altas en grasas (bistec estilo T-bone, carne molida para hamburguesas, costillas)    ¨ Huevos enteros y yemas de huevo (altos en grasas)    · Otros:      ¨ Condimentos hechos con sal, vashti sal de ajo, sal de apio, sal de cebolla, sal condimentada, suavizantes para miley, y glutamato de monosodio (MSG, por ivet siglas en inglés)    ¨ Sopa Miso y mezclas para sopa enlatadas o secas (altas en sodio)    ¨ Salsa de soya regular, salsa de [facility], salsa [place], salsa para bistec, [employer], y la mayoría de las vinagres con sabor (altas en sodio)    ¨ Condimentos regulares, vashti [place], salsa de tomate y aderezos para ensalada (altos en sodio)    ¨ Salsa para zack y salsas en general, vashti salsa [place] o de [place] (altas en sodio y [place])    ¨ Bebidas altas en azúcar, vashti bebidas gaseosas o jugos de frutas    ¨ Alimentos para [address], vashti kulwinder tostadas, palomitas de [place], pretzels, piel de cerdo, [address] de soda [facility], y nueces saladas    ¨ Alimentos congelados, vashti cenas preparadas, platos principales, vegetales con salsas, y zack cubiertas en pan (altas en sodio)  Otras pautas que debe seguir:   · Mantenga un peso saludable  [place] riesgo de enfermedad cardíaca es aún más alto si usted tiene sobrepeso  [place] médico podría sugerirle que adelgace si tiene sobrepeso  Usted puede perder peso si se propone consumir menos calorías y alimentos que tengan azúcar y grasas agregadas  El plan de alimentación DASH puede ayudarle a lograrlo  Nanci buena forma de disminuir el consumo de calorías es consumiendo porciones más pequeñas en cada comida y menos meriendas entre comidas  Consulte a rome médico para obtener más información sobre cómo adelgazar  · Realice actividad física con regularidad  El ejercicio regular puede ayudarle a alcanzar o mantener un peso saludable  El ejercicio regular también puede ayudarle a disminuir rome presión arterial y mejorar ivet niveles de colesterol  Jailene ejercicios moderados por 30 minutos o más todos los días de la [place]  Para bajar peso, asegúrese de ejercitarse por lo menos 60 minutos  Consulte con rome médico sobre un programa de ejercicio adecuado para usted  · Limite el consumo de alcohol  Las mujeres deberían limitar el consumo de alcohol a 1 bebida por día  Los hombres deberían limitar el consumo de alcohol a 2 tragos al día  Un trago equivale a 12 onzas de cerveza, 5 onzas de vino o 1 onza y ½ de licor  © 2017 [address] [place] [address] Information is for End User's use only and may not be sold, redistributed or otherwise used for commercial purposes  All illustrations and images included in CareNotes® are the copyrighted property of A D A M , Inc  or Archie Fowler  Esta información es sólo para uso en educación  Esteban intención no es darle un consejo médico sobre enfermedades o tratamientos  Colsulte con esteban Kenia Shouts farmacéutico antes de seguir cualquier régimen médico para saber si es seguro y efectivo para usted  Alert and oriented to person, place and time